# Patient Record
Sex: MALE | Race: WHITE | NOT HISPANIC OR LATINO | Employment: FULL TIME | URBAN - METROPOLITAN AREA
[De-identification: names, ages, dates, MRNs, and addresses within clinical notes are randomized per-mention and may not be internally consistent; named-entity substitution may affect disease eponyms.]

---

## 2020-11-24 ENCOUNTER — HOSPITAL ENCOUNTER (INPATIENT)
Facility: HOSPITAL | Age: 65
LOS: 8 days | Discharge: HOME | End: 2020-12-02
Attending: HOSPITALIST | Admitting: HOSPITALIST

## 2020-11-24 DIAGNOSIS — J96.01 ACUTE HYPOXEMIC RESPIRATORY FAILURE (HCC): Primary | ICD-10-CM

## 2020-11-24 DIAGNOSIS — J12.82 PNEUMONIA DUE TO COVID-19 VIRUS: ICD-10-CM

## 2020-11-24 DIAGNOSIS — U07.1 PNEUMONIA DUE TO COVID-19 VIRUS: ICD-10-CM

## 2020-11-24 PROBLEM — R09.02 HYPOXIA: Status: ACTIVE | Noted: 2020-11-24

## 2020-11-24 LAB
ALBUMIN SERPL-MCNC: 2.7 G/DL (ref 3.5–5.2)
ALP SERPL-CCNC: 68 U/L (ref 39–117)
ALT SERPL W P-5'-P-CCNC: 16 U/L (ref 1–41)
AST SERPL-CCNC: 28 U/L (ref 1–40)
BILIRUB CONJ SERPL-MCNC: 0.3 MG/DL (ref 0–0.3)
BILIRUB INDIRECT SERPL-MCNC: 0.2 MG/DL
BILIRUB SERPL-MCNC: 0.5 MG/DL (ref 0–1.2)
CREAT SERPL-MCNC: 0.67 MG/DL (ref 0.76–1.27)
FERRITIN SERPL-MCNC: 1040 NG/ML (ref 30–400)
GFR SERPL CREATININE-BSD FRML MDRD: 119 ML/MIN/1.73
LDH SERPL-CCNC: 454 U/L (ref 135–225)
PROCALCITONIN SERPL-MCNC: 0.36 NG/ML (ref 0–0.25)
PROT SERPL-MCNC: 5.9 G/DL (ref 6–8.5)

## 2020-11-24 PROCEDURE — 0202U NFCT DS 22 TRGT SARS-COV-2: CPT | Performed by: HOSPITALIST

## 2020-11-24 PROCEDURE — 94799 UNLISTED PULMONARY SVC/PX: CPT

## 2020-11-24 PROCEDURE — 82565 ASSAY OF CREATININE: CPT | Performed by: NURSE PRACTITIONER

## 2020-11-24 PROCEDURE — 83615 LACTATE (LD) (LDH) ENZYME: CPT | Performed by: NURSE PRACTITIONER

## 2020-11-24 PROCEDURE — 99223 1ST HOSP IP/OBS HIGH 75: CPT | Performed by: NURSE PRACTITIONER

## 2020-11-24 PROCEDURE — 80076 HEPATIC FUNCTION PANEL: CPT | Performed by: NURSE PRACTITIONER

## 2020-11-24 PROCEDURE — 87040 BLOOD CULTURE FOR BACTERIA: CPT | Performed by: NURSE PRACTITIONER

## 2020-11-24 PROCEDURE — 84145 PROCALCITONIN (PCT): CPT | Performed by: NURSE PRACTITIONER

## 2020-11-24 PROCEDURE — XW033E5 INTRODUCTION OF REMDESIVIR ANTI-INFECTIVE INTO PERIPHERAL VEIN, PERCUTANEOUS APPROACH, NEW TECHNOLOGY GROUP 5: ICD-10-PCS | Performed by: HOSPITALIST

## 2020-11-24 PROCEDURE — 25010000002 ENOXAPARIN PER 10 MG: Performed by: NURSE PRACTITIONER

## 2020-11-24 PROCEDURE — 82728 ASSAY OF FERRITIN: CPT | Performed by: NURSE PRACTITIONER

## 2020-11-24 RX ORDER — DEXAMETHASONE SODIUM PHOSPHATE 4 MG/ML
6 INJECTION, SOLUTION INTRA-ARTICULAR; INTRALESIONAL; INTRAMUSCULAR; INTRAVENOUS; SOFT TISSUE DAILY
Status: DISCONTINUED | OUTPATIENT
Start: 2020-11-25 | End: 2020-11-28

## 2020-11-24 RX ORDER — ONDANSETRON 4 MG/1
4 TABLET, ORALLY DISINTEGRATING ORAL EVERY 8 HOURS PRN
COMMUNITY

## 2020-11-24 RX ORDER — SODIUM CHLORIDE 0.9 % (FLUSH) 0.9 %
10 SYRINGE (ML) INJECTION EVERY 12 HOURS SCHEDULED
Status: DISCONTINUED | OUTPATIENT
Start: 2020-11-24 | End: 2020-12-02 | Stop reason: HOSPADM

## 2020-11-24 RX ORDER — ONDANSETRON 2 MG/ML
4 INJECTION INTRAMUSCULAR; INTRAVENOUS EVERY 6 HOURS PRN
Status: DISCONTINUED | OUTPATIENT
Start: 2020-11-24 | End: 2020-12-02 | Stop reason: HOSPADM

## 2020-11-24 RX ORDER — SODIUM CHLORIDE 9 MG/ML
100 INJECTION, SOLUTION INTRAVENOUS CONTINUOUS
Status: DISCONTINUED | OUTPATIENT
Start: 2020-11-24 | End: 2020-11-25

## 2020-11-24 RX ORDER — ONDANSETRON 4 MG/1
4 TABLET, FILM COATED ORAL EVERY 6 HOURS PRN
Status: DISCONTINUED | OUTPATIENT
Start: 2020-11-24 | End: 2020-12-02 | Stop reason: HOSPADM

## 2020-11-24 RX ORDER — TADALAFIL 20 MG/1
20 TABLET ORAL
COMMUNITY

## 2020-11-24 RX ORDER — ZINC SULFATE 50(220)MG
220 CAPSULE ORAL DAILY
Status: DISCONTINUED | OUTPATIENT
Start: 2020-11-24 | End: 2020-12-02 | Stop reason: HOSPADM

## 2020-11-24 RX ORDER — FAMOTIDINE 20 MG/1
20 TABLET, FILM COATED ORAL
Status: DISCONTINUED | OUTPATIENT
Start: 2020-11-24 | End: 2020-11-28

## 2020-11-24 RX ORDER — ASPIRIN 81 MG/1
81 TABLET ORAL DAILY
COMMUNITY

## 2020-11-24 RX ORDER — ASCORBIC ACID 500 MG
500 TABLET ORAL EVERY 6 HOURS SCHEDULED
Status: DISCONTINUED | OUTPATIENT
Start: 2020-11-24 | End: 2020-12-02 | Stop reason: HOSPADM

## 2020-11-24 RX ORDER — ACETAMINOPHEN 325 MG/1
650 TABLET ORAL EVERY 4 HOURS PRN
Status: DISCONTINUED | OUTPATIENT
Start: 2020-11-24 | End: 2020-12-02 | Stop reason: HOSPADM

## 2020-11-24 RX ORDER — AZITHROMYCIN 250 MG/1
250 TABLET, FILM COATED ORAL DAILY
Status: DISCONTINUED | OUTPATIENT
Start: 2020-11-25 | End: 2020-11-25

## 2020-11-24 RX ORDER — SODIUM CHLORIDE 0.9 % (FLUSH) 0.9 %
10 SYRINGE (ML) INJECTION AS NEEDED
Status: DISCONTINUED | OUTPATIENT
Start: 2020-11-24 | End: 2020-12-02 | Stop reason: HOSPADM

## 2020-11-24 RX ORDER — TERBINAFINE HYDROCHLORIDE 250 MG/1
250 TABLET ORAL DAILY
COMMUNITY

## 2020-11-24 RX ORDER — AMLODIPINE BESYLATE 5 MG/1
5 TABLET ORAL DAILY
COMMUNITY

## 2020-11-24 RX ORDER — ACETAMINOPHEN 650 MG/1
650 SUPPOSITORY RECTAL EVERY 4 HOURS PRN
Status: DISCONTINUED | OUTPATIENT
Start: 2020-11-24 | End: 2020-12-02 | Stop reason: HOSPADM

## 2020-11-24 RX ORDER — ACETAMINOPHEN 160 MG/5ML
650 SOLUTION ORAL EVERY 4 HOURS PRN
Status: DISCONTINUED | OUTPATIENT
Start: 2020-11-24 | End: 2020-12-02 | Stop reason: HOSPADM

## 2020-11-24 RX ORDER — MELOXICAM 15 MG/1
15 TABLET ORAL DAILY
COMMUNITY

## 2020-11-24 RX ORDER — CYCLOBENZAPRINE HCL 10 MG
10 TABLET ORAL 3 TIMES DAILY PRN
COMMUNITY

## 2020-11-24 RX ORDER — CHOLECALCIFEROL (VITAMIN D3) 125 MCG
5 CAPSULE ORAL NIGHTLY
Status: DISCONTINUED | OUTPATIENT
Start: 2020-11-24 | End: 2020-12-02

## 2020-11-24 RX ADMIN — SODIUM CHLORIDE 100 ML/HR: 9 INJECTION, SOLUTION INTRAVENOUS at 21:27

## 2020-11-24 RX ADMIN — Medication 5000 UNITS: at 21:26

## 2020-11-24 RX ADMIN — OXYCODONE HYDROCHLORIDE AND ACETAMINOPHEN 500 MG: 500 TABLET ORAL at 21:26

## 2020-11-24 RX ADMIN — SODIUM CHLORIDE 1000 ML: 9 INJECTION, SOLUTION INTRAVENOUS at 21:00

## 2020-11-24 RX ADMIN — REMDESIVIR 200 MG: 100 INJECTION, POWDER, LYOPHILIZED, FOR SOLUTION INTRAVENOUS at 23:21

## 2020-11-24 RX ADMIN — ZINC SULFATE 220 MG (50 MG) CAPSULE 220 MG: CAPSULE at 21:26

## 2020-11-24 RX ADMIN — Medication 10 ML: at 21:27

## 2020-11-24 RX ADMIN — ENOXAPARIN SODIUM 40 MG: 40 INJECTION SUBCUTANEOUS at 23:20

## 2020-11-24 RX ADMIN — FAMOTIDINE 20 MG: 20 TABLET ORAL at 21:26

## 2020-11-24 RX ADMIN — Medication 5 MG: at 21:26

## 2020-11-25 ENCOUNTER — APPOINTMENT (OUTPATIENT)
Dept: GENERAL RADIOLOGY | Facility: HOSPITAL | Age: 65
End: 2020-11-25

## 2020-11-25 PROBLEM — U07.1 LOWER RESPIRATORY TRACT INFECTION DUE TO SEVERE ACUTE RESPIRATORY SYNDROME CORONAVIRUS 2 (SARS-COV-2): Status: ACTIVE | Noted: 2020-11-16

## 2020-11-25 PROBLEM — J44.1 COPD WITH ACUTE EXACERBATION (HCC): Status: ACTIVE | Noted: 2020-11-25

## 2020-11-25 PROBLEM — J12.82 PNEUMONIA DUE TO COVID-19 VIRUS: Status: ACTIVE | Noted: 2020-11-25

## 2020-11-25 PROBLEM — G45.9 TRANSIENT ISCHEMIC ATTACK: Status: ACTIVE | Noted: 2020-01-09

## 2020-11-25 PROBLEM — I10 ESSENTIAL HYPERTENSION: Status: ACTIVE | Noted: 2020-11-25

## 2020-11-25 PROBLEM — J96.01 ACUTE HYPOXEMIC RESPIRATORY FAILURE (HCC): Status: ACTIVE | Noted: 2020-11-25

## 2020-11-25 PROBLEM — J22 LOWER RESPIRATORY TRACT INFECTION DUE TO SEVERE ACUTE RESPIRATORY SYNDROME CORONAVIRUS 2 (SARS-COV-2): Status: ACTIVE | Noted: 2020-11-16

## 2020-11-25 PROBLEM — U07.1 PNEUMONIA DUE TO COVID-19 VIRUS: Status: ACTIVE | Noted: 2020-11-25

## 2020-11-25 PROBLEM — Z87.891 FORMER HEAVY TOBACCO SMOKER: Status: ACTIVE | Noted: 2019-06-05

## 2020-11-25 LAB
ALBUMIN SERPL-MCNC: 2.8 G/DL (ref 3.5–5.2)
ALBUMIN/GLOB SERPL: 0.9 G/DL
ALP SERPL-CCNC: 68 U/L (ref 39–117)
ALT SERPL W P-5'-P-CCNC: 15 U/L (ref 1–41)
ANION GAP SERPL CALCULATED.3IONS-SCNC: 12 MMOL/L (ref 5–15)
ARTERIAL PATENCY WRIST A: POSITIVE
AST SERPL-CCNC: 26 U/L (ref 1–40)
ATMOSPHERIC PRESS: ABNORMAL MM[HG]
B PARAPERT DNA SPEC QL NAA+PROBE: NOT DETECTED
B PERT DNA SPEC QL NAA+PROBE: NOT DETECTED
BASE EXCESS BLDA CALC-SCNC: -1.9 MMOL/L (ref 0–3)
BASOPHILS # BLD AUTO: 0 10*3/MM3 (ref 0–0.2)
BASOPHILS NFR BLD AUTO: 0.1 % (ref 0–1.5)
BDY SITE: ABNORMAL
BILIRUB CONJ SERPL-MCNC: 0.2 MG/DL (ref 0–0.3)
BILIRUB SERPL-MCNC: 0.5 MG/DL (ref 0–1.2)
BUN SERPL-MCNC: 10 MG/DL (ref 8–23)
BUN/CREAT SERPL: 15.9 (ref 7–25)
C PNEUM DNA NPH QL NAA+NON-PROBE: NOT DETECTED
CALCIUM SPEC-SCNC: 7.7 MG/DL (ref 8.6–10.5)
CHLORIDE SERPL-SCNC: 105 MMOL/L (ref 98–107)
CK SERPL-CCNC: 95 U/L (ref 20–200)
CO2 BLDA-SCNC: 22.1 MMOL/L (ref 22–29)
CO2 SERPL-SCNC: 21 MMOL/L (ref 22–29)
CREAT SERPL-MCNC: 0.63 MG/DL (ref 0.76–1.27)
CRP SERPL-MCNC: 29.66 MG/DL (ref 0–0.5)
D DIMER PPP FEU-MCNC: 3.99 MG/L (FEU) (ref 0–0.59)
D-LACTATE SERPL-SCNC: 1.2 MMOL/L (ref 0.5–2)
DEPRECATED RDW RBC AUTO: 42.9 FL (ref 37–54)
EOSINOPHIL # BLD AUTO: 0 10*3/MM3 (ref 0–0.4)
EOSINOPHIL NFR BLD AUTO: 0 % (ref 0.3–6.2)
ERYTHROCYTE [DISTWIDTH] IN BLOOD BY AUTOMATED COUNT: 13.9 % (ref 12.3–15.4)
FERRITIN SERPL-MCNC: 1124 NG/ML (ref 30–400)
FIBRINOGEN PPP-MCNC: 824 MG/DL (ref 210–450)
FLUAV H1 2009 PAND RNA NPH QL NAA+PROBE: NOT DETECTED
FLUAV H1 HA GENE NPH QL NAA+PROBE: NOT DETECTED
FLUAV H3 RNA NPH QL NAA+PROBE: NOT DETECTED
FLUAV SUBTYP SPEC NAA+PROBE: NOT DETECTED
FLUBV RNA ISLT QL NAA+PROBE: NOT DETECTED
GFR SERPL CREATININE-BSD FRML MDRD: 128 ML/MIN/1.73
GLOBULIN UR ELPH-MCNC: 3 GM/DL
GLUCOSE SERPL-MCNC: 117 MG/DL (ref 65–99)
HADV DNA SPEC NAA+PROBE: NOT DETECTED
HCO3 BLDA-SCNC: 21.2 MMOL/L (ref 21–28)
HCOV 229E RNA SPEC QL NAA+PROBE: NOT DETECTED
HCOV HKU1 RNA SPEC QL NAA+PROBE: NOT DETECTED
HCOV NL63 RNA SPEC QL NAA+PROBE: NOT DETECTED
HCOV OC43 RNA SPEC QL NAA+PROBE: NOT DETECTED
HCT VFR BLD AUTO: 40.4 % (ref 37.5–51)
HEMODILUTION: NO
HGB BLD-MCNC: 13.5 G/DL (ref 13–17.7)
HMPV RNA NPH QL NAA+NON-PROBE: NOT DETECTED
HPIV1 RNA SPEC QL NAA+PROBE: NOT DETECTED
HPIV2 RNA SPEC QL NAA+PROBE: NOT DETECTED
HPIV3 RNA NPH QL NAA+PROBE: NOT DETECTED
HPIV4 P GENE NPH QL NAA+PROBE: NOT DETECTED
INHALED O2 CONCENTRATION: 100 %
L PNEUMO1 AG UR QL IA: NEGATIVE
LDH SERPL-CCNC: 485 U/L (ref 135–225)
LYMPHOCYTES # BLD AUTO: 0.2 10*3/MM3 (ref 0.7–3.1)
LYMPHOCYTES NFR BLD AUTO: 4.1 % (ref 19.6–45.3)
M PNEUMO IGG SER IA-ACNC: NOT DETECTED
MCH RBC QN AUTO: 29.9 PG (ref 26.6–33)
MCHC RBC AUTO-ENTMCNC: 33.3 G/DL (ref 31.5–35.7)
MCV RBC AUTO: 89.8 FL (ref 79–97)
MODALITY: ABNORMAL
MONOCYTES # BLD AUTO: 0.2 10*3/MM3 (ref 0.1–0.9)
MONOCYTES NFR BLD AUTO: 4.8 % (ref 5–12)
MRSA DNA SPEC QL NAA+PROBE: NORMAL
NEUTROPHILS NFR BLD AUTO: 4 10*3/MM3 (ref 1.7–7)
NEUTROPHILS NFR BLD AUTO: 91 % (ref 42.7–76)
NRBC BLD AUTO-RTO: 0.4 /100 WBC (ref 0–0.2)
PCO2 BLDA: 30.8 MM HG (ref 35–48)
PH BLDA: 7.45 PH UNITS (ref 7.35–7.45)
PLATELET # BLD AUTO: 341 10*3/MM3 (ref 140–450)
PMV BLD AUTO: 6.9 FL (ref 6–12)
PO2 BLDA: 71.4 MM HG (ref 83–108)
POTASSIUM SERPL-SCNC: 4.1 MMOL/L (ref 3.5–5.2)
PROCALCITONIN SERPL-MCNC: 0.31 NG/ML (ref 0–0.25)
PROT SERPL-MCNC: 5.8 G/DL (ref 6–8.5)
RBC # BLD AUTO: 4.5 10*6/MM3 (ref 4.14–5.8)
RHINOVIRUS RNA SPEC NAA+PROBE: NOT DETECTED
RSV RNA NPH QL NAA+NON-PROBE: NOT DETECTED
S PNEUM AG SPEC QL LA: NEGATIVE
SAO2 % BLDCOA: 95 % (ref 94–98)
SARS-COV-2 RNA NPH QL NAA+NON-PROBE: DETECTED
SODIUM SERPL-SCNC: 138 MMOL/L (ref 136–145)
WBC # BLD AUTO: 4.4 10*3/MM3 (ref 3.4–10.8)

## 2020-11-25 PROCEDURE — 83615 LACTATE (LD) (LDH) ENZYME: CPT | Performed by: NURSE PRACTITIONER

## 2020-11-25 PROCEDURE — 99232 SBSQ HOSP IP/OBS MODERATE 35: CPT | Performed by: HOSPITALIST

## 2020-11-25 PROCEDURE — 86140 C-REACTIVE PROTEIN: CPT | Performed by: NURSE PRACTITIONER

## 2020-11-25 PROCEDURE — 94799 UNLISTED PULMONARY SVC/PX: CPT

## 2020-11-25 PROCEDURE — 85379 FIBRIN DEGRADATION QUANT: CPT

## 2020-11-25 PROCEDURE — 36600 WITHDRAWAL OF ARTERIAL BLOOD: CPT

## 2020-11-25 PROCEDURE — 82803 BLOOD GASES ANY COMBINATION: CPT

## 2020-11-25 PROCEDURE — 85025 COMPLETE CBC W/AUTO DIFF WBC: CPT | Performed by: NURSE PRACTITIONER

## 2020-11-25 PROCEDURE — 25010000002 DEXAMETHASONE PER 1 MG: Performed by: NURSE PRACTITIONER

## 2020-11-25 PROCEDURE — 71045 X-RAY EXAM CHEST 1 VIEW: CPT

## 2020-11-25 PROCEDURE — 82728 ASSAY OF FERRITIN: CPT | Performed by: NURSE PRACTITIONER

## 2020-11-25 PROCEDURE — 25010000002 CEFEPIME PER 500 MG: Performed by: HOSPITALIST

## 2020-11-25 PROCEDURE — 87641 MR-STAPH DNA AMP PROBE: CPT | Performed by: HOSPITALIST

## 2020-11-25 PROCEDURE — 25010000002 ENOXAPARIN PER 10 MG: Performed by: NURSE PRACTITIONER

## 2020-11-25 PROCEDURE — 82248 BILIRUBIN DIRECT: CPT | Performed by: NURSE PRACTITIONER

## 2020-11-25 PROCEDURE — 85384 FIBRINOGEN ACTIVITY: CPT | Performed by: NURSE PRACTITIONER

## 2020-11-25 PROCEDURE — 83605 ASSAY OF LACTIC ACID: CPT | Performed by: NURSE PRACTITIONER

## 2020-11-25 PROCEDURE — 87899 AGENT NOS ASSAY W/OPTIC: CPT | Performed by: HOSPITALIST

## 2020-11-25 PROCEDURE — 84145 PROCALCITONIN (PCT): CPT | Performed by: NURSE PRACTITIONER

## 2020-11-25 PROCEDURE — 80053 COMPREHEN METABOLIC PANEL: CPT | Performed by: NURSE PRACTITIONER

## 2020-11-25 PROCEDURE — 82550 ASSAY OF CK (CPK): CPT | Performed by: NURSE PRACTITIONER

## 2020-11-25 RX ORDER — SODIUM CHLORIDE 9 MG/ML
100 INJECTION, SOLUTION INTRAVENOUS ONCE
Status: COMPLETED | OUTPATIENT
Start: 2020-11-25 | End: 2020-11-25

## 2020-11-25 RX ORDER — ALBUTEROL SULFATE 90 UG/1
2 AEROSOL, METERED RESPIRATORY (INHALATION)
Status: DISCONTINUED | OUTPATIENT
Start: 2020-11-25 | End: 2020-12-02 | Stop reason: HOSPADM

## 2020-11-25 RX ORDER — AMLODIPINE BESYLATE 5 MG/1
5 TABLET ORAL DAILY
Status: DISCONTINUED | OUTPATIENT
Start: 2020-11-25 | End: 2020-12-02 | Stop reason: HOSPADM

## 2020-11-25 RX ORDER — ASPIRIN 81 MG/1
81 TABLET ORAL DAILY
Status: DISCONTINUED | OUTPATIENT
Start: 2020-11-25 | End: 2020-12-02 | Stop reason: HOSPADM

## 2020-11-25 RX ORDER — CYCLOBENZAPRINE HCL 10 MG
10 TABLET ORAL 3 TIMES DAILY PRN
Status: DISCONTINUED | OUTPATIENT
Start: 2020-11-25 | End: 2020-12-02 | Stop reason: HOSPADM

## 2020-11-25 RX ADMIN — DEXAMETHASONE SODIUM PHOSPHATE 6 MG: 4 INJECTION, SOLUTION INTRAMUSCULAR; INTRAVENOUS at 08:17

## 2020-11-25 RX ADMIN — Medication 5 MG: at 20:16

## 2020-11-25 RX ADMIN — ASPIRIN 81 MG: 81 TABLET, COATED ORAL at 08:18

## 2020-11-25 RX ADMIN — ENOXAPARIN SODIUM 40 MG: 40 INJECTION SUBCUTANEOUS at 12:13

## 2020-11-25 RX ADMIN — Medication 10 ML: at 20:16

## 2020-11-25 RX ADMIN — OXYCODONE HYDROCHLORIDE AND ACETAMINOPHEN 500 MG: 500 TABLET ORAL at 05:40

## 2020-11-25 RX ADMIN — CEFEPIME 2 G: 2 INJECTION, POWDER, FOR SOLUTION INTRAVENOUS at 16:57

## 2020-11-25 RX ADMIN — ENOXAPARIN SODIUM 40 MG: 40 INJECTION SUBCUTANEOUS at 23:07

## 2020-11-25 RX ADMIN — Medication 10 ML: at 08:18

## 2020-11-25 RX ADMIN — AMLODIPINE BESYLATE 5 MG: 5 TABLET ORAL at 08:18

## 2020-11-25 RX ADMIN — FAMOTIDINE 20 MG: 20 TABLET ORAL at 16:57

## 2020-11-25 RX ADMIN — Medication 600 MG: at 08:16

## 2020-11-25 RX ADMIN — OXYCODONE HYDROCHLORIDE AND ACETAMINOPHEN 500 MG: 500 TABLET ORAL at 16:57

## 2020-11-25 RX ADMIN — OXYCODONE HYDROCHLORIDE AND ACETAMINOPHEN 500 MG: 500 TABLET ORAL at 23:07

## 2020-11-25 RX ADMIN — ALBUTEROL SULFATE 2 PUFF: 108 AEROSOL, METERED RESPIRATORY (INHALATION) at 07:38

## 2020-11-25 RX ADMIN — AZITHROMYCIN 250 MG: 250 TABLET, FILM COATED ORAL at 08:18

## 2020-11-25 RX ADMIN — ALBUTEROL SULFATE 2 PUFF: 108 AEROSOL, METERED RESPIRATORY (INHALATION) at 15:01

## 2020-11-25 RX ADMIN — THEOPHYLLINE ANHYDROUS 300 MG: 300 CAPSULE, EXTENDED RELEASE ORAL at 08:32

## 2020-11-25 RX ADMIN — FAMOTIDINE 20 MG: 20 TABLET ORAL at 06:39

## 2020-11-25 RX ADMIN — OXYCODONE HYDROCHLORIDE AND ACETAMINOPHEN 500 MG: 500 TABLET ORAL at 12:13

## 2020-11-25 RX ADMIN — REMDESIVIR 100 MG: 100 INJECTION, POWDER, LYOPHILIZED, FOR SOLUTION INTRAVENOUS at 23:08

## 2020-11-25 RX ADMIN — ALBUTEROL SULFATE 2 PUFF: 108 AEROSOL, METERED RESPIRATORY (INHALATION) at 18:09

## 2020-11-25 RX ADMIN — SODIUM CHLORIDE 100 ML/HR: 9 INJECTION, SOLUTION INTRAVENOUS at 13:54

## 2020-11-25 RX ADMIN — Medication 600 MG: at 20:16

## 2020-11-25 RX ADMIN — ALBUTEROL SULFATE 2 PUFF: 108 AEROSOL, METERED RESPIRATORY (INHALATION) at 11:35

## 2020-11-26 LAB
ALBUMIN SERPL-MCNC: 2.8 G/DL (ref 3.5–5.2)
ALBUMIN/GLOB SERPL: 1 G/DL
ALP SERPL-CCNC: 68 U/L (ref 39–117)
ALT SERPL W P-5'-P-CCNC: 17 U/L (ref 1–41)
ANION GAP SERPL CALCULATED.3IONS-SCNC: 8 MMOL/L (ref 5–15)
AST SERPL-CCNC: 23 U/L (ref 1–40)
BASOPHILS # BLD AUTO: 0 10*3/MM3 (ref 0–0.2)
BASOPHILS NFR BLD AUTO: 0.1 % (ref 0–1.5)
BILIRUB CONJ SERPL-MCNC: <0.2 MG/DL (ref 0–0.3)
BILIRUB SERPL-MCNC: 0.3 MG/DL (ref 0–1.2)
BUN SERPL-MCNC: 11 MG/DL (ref 8–23)
BUN/CREAT SERPL: 19.3 (ref 7–25)
CALCIUM SPEC-SCNC: 7.7 MG/DL (ref 8.6–10.5)
CHLORIDE SERPL-SCNC: 108 MMOL/L (ref 98–107)
CK SERPL-CCNC: 82 U/L (ref 20–200)
CO2 SERPL-SCNC: 23 MMOL/L (ref 22–29)
CREAT SERPL-MCNC: 0.57 MG/DL (ref 0.76–1.27)
CRP SERPL-MCNC: 8.99 MG/DL (ref 0–0.5)
DEPRECATED RDW RBC AUTO: 43.8 FL (ref 37–54)
EOSINOPHIL # BLD AUTO: 0 10*3/MM3 (ref 0–0.4)
EOSINOPHIL NFR BLD AUTO: 0 % (ref 0.3–6.2)
ERYTHROCYTE [DISTWIDTH] IN BLOOD BY AUTOMATED COUNT: 13.9 % (ref 12.3–15.4)
FERRITIN SERPL-MCNC: 1301 NG/ML (ref 30–400)
GFR SERPL CREATININE-BSD FRML MDRD: 143 ML/MIN/1.73
GLOBULIN UR ELPH-MCNC: 2.8 GM/DL
GLUCOSE SERPL-MCNC: 146 MG/DL (ref 65–99)
HCT VFR BLD AUTO: 35.8 % (ref 37.5–51)
HGB BLD-MCNC: 11.8 G/DL (ref 13–17.7)
LYMPHOCYTES # BLD AUTO: 0.3 10*3/MM3 (ref 0.7–3.1)
LYMPHOCYTES NFR BLD AUTO: 3.3 % (ref 19.6–45.3)
MCH RBC QN AUTO: 29.4 PG (ref 26.6–33)
MCHC RBC AUTO-ENTMCNC: 33.1 G/DL (ref 31.5–35.7)
MCV RBC AUTO: 88.9 FL (ref 79–97)
MONOCYTES # BLD AUTO: 0.5 10*3/MM3 (ref 0.1–0.9)
MONOCYTES NFR BLD AUTO: 7 % (ref 5–12)
NEUTROPHILS NFR BLD AUTO: 7 10*3/MM3 (ref 1.7–7)
NEUTROPHILS NFR BLD AUTO: 89.6 % (ref 42.7–76)
NRBC BLD AUTO-RTO: 0.1 /100 WBC (ref 0–0.2)
PLATELET # BLD AUTO: 383 10*3/MM3 (ref 140–450)
PMV BLD AUTO: 6.5 FL (ref 6–12)
POTASSIUM SERPL-SCNC: 3.9 MMOL/L (ref 3.5–5.2)
PROT SERPL-MCNC: 5.6 G/DL (ref 6–8.5)
RBC # BLD AUTO: 4.02 10*6/MM3 (ref 4.14–5.8)
SODIUM SERPL-SCNC: 139 MMOL/L (ref 136–145)
WBC # BLD AUTO: 7.8 10*3/MM3 (ref 3.4–10.8)

## 2020-11-26 PROCEDURE — 25010000002 ENOXAPARIN PER 10 MG: Performed by: NURSE PRACTITIONER

## 2020-11-26 PROCEDURE — 25010000002 VANCOMYCIN 10 G RECONSTITUTED SOLUTION: Performed by: HOSPITALIST

## 2020-11-26 PROCEDURE — 82248 BILIRUBIN DIRECT: CPT | Performed by: NURSE PRACTITIONER

## 2020-11-26 PROCEDURE — 99232 SBSQ HOSP IP/OBS MODERATE 35: CPT | Performed by: HOSPITALIST

## 2020-11-26 PROCEDURE — 82728 ASSAY OF FERRITIN: CPT | Performed by: NURSE PRACTITIONER

## 2020-11-26 PROCEDURE — 80053 COMPREHEN METABOLIC PANEL: CPT | Performed by: NURSE PRACTITIONER

## 2020-11-26 PROCEDURE — 94799 UNLISTED PULMONARY SVC/PX: CPT

## 2020-11-26 PROCEDURE — 25010000002 CEFEPIME PER 500 MG: Performed by: HOSPITALIST

## 2020-11-26 PROCEDURE — 25010000002 ONDANSETRON PER 1 MG: Performed by: NURSE PRACTITIONER

## 2020-11-26 PROCEDURE — 82550 ASSAY OF CK (CPK): CPT | Performed by: NURSE PRACTITIONER

## 2020-11-26 PROCEDURE — 25010000002 DEXAMETHASONE PER 1 MG: Performed by: NURSE PRACTITIONER

## 2020-11-26 PROCEDURE — 86140 C-REACTIVE PROTEIN: CPT | Performed by: NURSE PRACTITIONER

## 2020-11-26 PROCEDURE — 85025 COMPLETE CBC W/AUTO DIFF WBC: CPT | Performed by: NURSE PRACTITIONER

## 2020-11-26 RX ORDER — AMOXICILLIN AND CLAVULANATE POTASSIUM 875; 125 MG/1; MG/1
1 TABLET, FILM COATED ORAL EVERY 12 HOURS SCHEDULED
Status: DISCONTINUED | OUTPATIENT
Start: 2020-11-26 | End: 2020-12-02 | Stop reason: HOSPADM

## 2020-11-26 RX ORDER — FUROSEMIDE 20 MG/1
20 TABLET ORAL DAILY
Status: DISCONTINUED | OUTPATIENT
Start: 2020-11-26 | End: 2020-12-02 | Stop reason: HOSPADM

## 2020-11-26 RX ORDER — VANCOMYCIN 1.75 GRAM/500 ML IN 0.9 % SODIUM CHLORIDE INTRAVENOUS
20 ONCE
Status: COMPLETED | OUTPATIENT
Start: 2020-11-26 | End: 2020-11-26

## 2020-11-26 RX ADMIN — ONDANSETRON 4 MG: 2 INJECTION, SOLUTION INTRAMUSCULAR; INTRAVENOUS at 08:47

## 2020-11-26 RX ADMIN — Medication 10 ML: at 20:49

## 2020-11-26 RX ADMIN — Medication 600 MG: at 08:21

## 2020-11-26 RX ADMIN — ALBUTEROL SULFATE 2 PUFF: 108 AEROSOL, METERED RESPIRATORY (INHALATION) at 19:04

## 2020-11-26 RX ADMIN — FAMOTIDINE 20 MG: 20 TABLET ORAL at 08:21

## 2020-11-26 RX ADMIN — ENOXAPARIN SODIUM 40 MG: 40 INJECTION SUBCUTANEOUS at 08:20

## 2020-11-26 RX ADMIN — ACETAMINOPHEN 650 MG: 325 TABLET, FILM COATED ORAL at 11:36

## 2020-11-26 RX ADMIN — ALBUTEROL SULFATE 2 PUFF: 108 AEROSOL, METERED RESPIRATORY (INHALATION) at 06:47

## 2020-11-26 RX ADMIN — ASPIRIN 81 MG: 81 TABLET, COATED ORAL at 08:21

## 2020-11-26 RX ADMIN — AMLODIPINE BESYLATE 5 MG: 5 TABLET ORAL at 09:03

## 2020-11-26 RX ADMIN — VANCOMYCIN HYDROCHLORIDE 1750 MG: 10 INJECTION, POWDER, LYOPHILIZED, FOR SOLUTION INTRAVENOUS at 12:15

## 2020-11-26 RX ADMIN — THEOPHYLLINE ANHYDROUS 300 MG: 300 CAPSULE, EXTENDED RELEASE ORAL at 09:06

## 2020-11-26 RX ADMIN — OXYCODONE HYDROCHLORIDE AND ACETAMINOPHEN 500 MG: 500 TABLET ORAL at 18:14

## 2020-11-26 RX ADMIN — CEFEPIME 2 G: 2 INJECTION, POWDER, FOR SOLUTION INTRAVENOUS at 00:54

## 2020-11-26 RX ADMIN — DEXAMETHASONE SODIUM PHOSPHATE 6 MG: 4 INJECTION, SOLUTION INTRAMUSCULAR; INTRAVENOUS at 08:21

## 2020-11-26 RX ADMIN — ZINC SULFATE 220 MG (50 MG) CAPSULE 220 MG: CAPSULE at 08:21

## 2020-11-26 RX ADMIN — FUROSEMIDE 20 MG: 20 TABLET ORAL at 08:21

## 2020-11-26 RX ADMIN — OXYCODONE HYDROCHLORIDE AND ACETAMINOPHEN 500 MG: 500 TABLET ORAL at 23:15

## 2020-11-26 RX ADMIN — AMOXICILLIN AND CLAVULANATE POTASSIUM 1 TABLET: 875; 125 TABLET, FILM COATED ORAL at 14:11

## 2020-11-26 RX ADMIN — OXYCODONE HYDROCHLORIDE AND ACETAMINOPHEN 500 MG: 500 TABLET ORAL at 04:57

## 2020-11-26 RX ADMIN — Medication 5000 UNITS: at 08:21

## 2020-11-26 RX ADMIN — Medication 5 MG: at 20:49

## 2020-11-26 RX ADMIN — OXYCODONE HYDROCHLORIDE AND ACETAMINOPHEN 500 MG: 500 TABLET ORAL at 08:21

## 2020-11-26 RX ADMIN — AMOXICILLIN AND CLAVULANATE POTASSIUM 1 TABLET: 875; 125 TABLET, FILM COATED ORAL at 20:49

## 2020-11-26 RX ADMIN — ALBUTEROL SULFATE 2 PUFF: 108 AEROSOL, METERED RESPIRATORY (INHALATION) at 15:07

## 2020-11-26 RX ADMIN — REMDESIVIR 100 MG: 100 INJECTION, POWDER, LYOPHILIZED, FOR SOLUTION INTRAVENOUS at 22:04

## 2020-11-26 RX ADMIN — Medication 10 ML: at 08:47

## 2020-11-26 RX ADMIN — Medication 600 MG: at 20:49

## 2020-11-26 RX ADMIN — ENOXAPARIN SODIUM 40 MG: 40 INJECTION SUBCUTANEOUS at 22:04

## 2020-11-26 RX ADMIN — CEFEPIME 2 G: 2 INJECTION, POWDER, FOR SOLUTION INTRAVENOUS at 09:02

## 2020-11-26 RX ADMIN — ALBUTEROL SULFATE 2 PUFF: 108 AEROSOL, METERED RESPIRATORY (INHALATION) at 11:57

## 2020-11-26 RX ADMIN — FAMOTIDINE 20 MG: 20 TABLET ORAL at 14:11

## 2020-11-27 LAB
ALBUMIN SERPL-MCNC: 3.1 G/DL (ref 3.5–5.2)
ALBUMIN/GLOB SERPL: 1.1 G/DL
ALP SERPL-CCNC: 85 U/L (ref 39–117)
ALT SERPL W P-5'-P-CCNC: 22 U/L (ref 1–41)
ANION GAP SERPL CALCULATED.3IONS-SCNC: 10 MMOL/L (ref 5–15)
AST SERPL-CCNC: 27 U/L (ref 1–40)
BASOPHILS # BLD AUTO: 0 10*3/MM3 (ref 0–0.2)
BASOPHILS NFR BLD AUTO: 0.2 % (ref 0–1.5)
BILIRUB CONJ SERPL-MCNC: 0.2 MG/DL (ref 0–0.3)
BILIRUB SERPL-MCNC: 0.5 MG/DL (ref 0–1.2)
BUN SERPL-MCNC: 14 MG/DL (ref 8–23)
BUN/CREAT SERPL: 22.2 (ref 7–25)
CALCIUM SPEC-SCNC: 8 MG/DL (ref 8.6–10.5)
CHLORIDE SERPL-SCNC: 106 MMOL/L (ref 98–107)
CK SERPL-CCNC: 164 U/L (ref 20–200)
CO2 SERPL-SCNC: 21 MMOL/L (ref 22–29)
CREAT SERPL-MCNC: 0.63 MG/DL (ref 0.76–1.27)
CRP SERPL-MCNC: 4.68 MG/DL (ref 0–0.5)
D DIMER PPP FEU-MCNC: 1.98 MG/L (FEU) (ref 0–0.59)
DEPRECATED RDW RBC AUTO: 42.9 FL (ref 37–54)
EOSINOPHIL # BLD AUTO: 0 10*3/MM3 (ref 0–0.4)
EOSINOPHIL NFR BLD AUTO: 0 % (ref 0.3–6.2)
ERYTHROCYTE [DISTWIDTH] IN BLOOD BY AUTOMATED COUNT: 14 % (ref 12.3–15.4)
FERRITIN SERPL-MCNC: 1176 NG/ML (ref 30–400)
FIBRINOGEN PPP-MCNC: 534 MG/DL (ref 210–450)
GFR SERPL CREATININE-BSD FRML MDRD: 128 ML/MIN/1.73
GLOBULIN UR ELPH-MCNC: 2.8 GM/DL
GLUCOSE SERPL-MCNC: 109 MG/DL (ref 65–99)
HCT VFR BLD AUTO: 40.3 % (ref 37.5–51)
HGB BLD-MCNC: 13.8 G/DL (ref 13–17.7)
LDH SERPL-CCNC: 448 U/L (ref 135–225)
LYMPHOCYTES # BLD AUTO: 0.4 10*3/MM3 (ref 0.7–3.1)
LYMPHOCYTES NFR BLD AUTO: 4 % (ref 19.6–45.3)
MCH RBC QN AUTO: 30.4 PG (ref 26.6–33)
MCHC RBC AUTO-ENTMCNC: 34.2 G/DL (ref 31.5–35.7)
MCV RBC AUTO: 88.7 FL (ref 79–97)
MONOCYTES # BLD AUTO: 0.5 10*3/MM3 (ref 0.1–0.9)
MONOCYTES NFR BLD AUTO: 5.7 % (ref 5–12)
NEUTROPHILS NFR BLD AUTO: 8.6 10*3/MM3 (ref 1.7–7)
NEUTROPHILS NFR BLD AUTO: 90.1 % (ref 42.7–76)
NRBC BLD AUTO-RTO: 0.1 /100 WBC (ref 0–0.2)
PLATELET # BLD AUTO: 455 10*3/MM3 (ref 140–450)
PMV BLD AUTO: 6.2 FL (ref 6–12)
POTASSIUM SERPL-SCNC: 4.1 MMOL/L (ref 3.5–5.2)
PROCALCITONIN SERPL-MCNC: 0.11 NG/ML (ref 0–0.25)
PROT SERPL-MCNC: 5.9 G/DL (ref 6–8.5)
RBC # BLD AUTO: 4.55 10*6/MM3 (ref 4.14–5.8)
SODIUM SERPL-SCNC: 137 MMOL/L (ref 136–145)
WBC # BLD AUTO: 9.5 10*3/MM3 (ref 3.4–10.8)

## 2020-11-27 PROCEDURE — 82550 ASSAY OF CK (CPK): CPT | Performed by: NURSE PRACTITIONER

## 2020-11-27 PROCEDURE — 99232 SBSQ HOSP IP/OBS MODERATE 35: CPT | Performed by: HOSPITALIST

## 2020-11-27 PROCEDURE — 80053 COMPREHEN METABOLIC PANEL: CPT | Performed by: NURSE PRACTITIONER

## 2020-11-27 PROCEDURE — 84145 PROCALCITONIN (PCT): CPT | Performed by: NURSE PRACTITIONER

## 2020-11-27 PROCEDURE — 94799 UNLISTED PULMONARY SVC/PX: CPT

## 2020-11-27 PROCEDURE — 83615 LACTATE (LD) (LDH) ENZYME: CPT | Performed by: NURSE PRACTITIONER

## 2020-11-27 PROCEDURE — 25010000002 ENOXAPARIN PER 10 MG: Performed by: NURSE PRACTITIONER

## 2020-11-27 PROCEDURE — 85384 FIBRINOGEN ACTIVITY: CPT | Performed by: NURSE PRACTITIONER

## 2020-11-27 PROCEDURE — 86140 C-REACTIVE PROTEIN: CPT | Performed by: NURSE PRACTITIONER

## 2020-11-27 PROCEDURE — 25010000002 DEXAMETHASONE PER 1 MG: Performed by: NURSE PRACTITIONER

## 2020-11-27 PROCEDURE — 82728 ASSAY OF FERRITIN: CPT | Performed by: NURSE PRACTITIONER

## 2020-11-27 PROCEDURE — 85379 FIBRIN DEGRADATION QUANT: CPT | Performed by: HOSPITALIST

## 2020-11-27 PROCEDURE — 85025 COMPLETE CBC W/AUTO DIFF WBC: CPT | Performed by: NURSE PRACTITIONER

## 2020-11-27 PROCEDURE — 82248 BILIRUBIN DIRECT: CPT | Performed by: NURSE PRACTITIONER

## 2020-11-27 RX ORDER — COLCHICINE 0.6 MG/1
0.6 TABLET ORAL DAILY
Status: DISCONTINUED | OUTPATIENT
Start: 2020-11-27 | End: 2020-12-02 | Stop reason: HOSPADM

## 2020-11-27 RX ADMIN — Medication 10 ML: at 09:19

## 2020-11-27 RX ADMIN — COLCHICINE 0.6 MG: 0.6 TABLET, FILM COATED ORAL at 12:16

## 2020-11-27 RX ADMIN — OXYCODONE HYDROCHLORIDE AND ACETAMINOPHEN 500 MG: 500 TABLET ORAL at 12:17

## 2020-11-27 RX ADMIN — Medication 5 MG: at 20:00

## 2020-11-27 RX ADMIN — Medication 10 ML: at 20:01

## 2020-11-27 RX ADMIN — FAMOTIDINE 20 MG: 20 TABLET ORAL at 17:27

## 2020-11-27 RX ADMIN — CYCLOBENZAPRINE HYDROCHLORIDE 10 MG: 10 TABLET, FILM COATED ORAL at 21:54

## 2020-11-27 RX ADMIN — ALBUTEROL SULFATE 2 PUFF: 108 AEROSOL, METERED RESPIRATORY (INHALATION) at 15:42

## 2020-11-27 RX ADMIN — FAMOTIDINE 20 MG: 20 TABLET ORAL at 09:13

## 2020-11-27 RX ADMIN — DEXAMETHASONE SODIUM PHOSPHATE 6 MG: 4 INJECTION, SOLUTION INTRAMUSCULAR; INTRAVENOUS at 09:13

## 2020-11-27 RX ADMIN — ALBUTEROL SULFATE 2 PUFF: 108 AEROSOL, METERED RESPIRATORY (INHALATION) at 08:36

## 2020-11-27 RX ADMIN — AMLODIPINE BESYLATE 5 MG: 5 TABLET ORAL at 09:13

## 2020-11-27 RX ADMIN — ALBUTEROL SULFATE 2 PUFF: 108 AEROSOL, METERED RESPIRATORY (INHALATION) at 11:15

## 2020-11-27 RX ADMIN — Medication 5000 UNITS: at 09:12

## 2020-11-27 RX ADMIN — OXYCODONE HYDROCHLORIDE AND ACETAMINOPHEN 500 MG: 500 TABLET ORAL at 05:22

## 2020-11-27 RX ADMIN — OXYCODONE HYDROCHLORIDE AND ACETAMINOPHEN 500 MG: 500 TABLET ORAL at 17:27

## 2020-11-27 RX ADMIN — Medication 600 MG: at 09:13

## 2020-11-27 RX ADMIN — Medication 600 MG: at 20:00

## 2020-11-27 RX ADMIN — THEOPHYLLINE ANHYDROUS 300 MG: 300 CAPSULE, EXTENDED RELEASE ORAL at 09:13

## 2020-11-27 RX ADMIN — FUROSEMIDE 20 MG: 20 TABLET ORAL at 09:13

## 2020-11-27 RX ADMIN — AMOXICILLIN AND CLAVULANATE POTASSIUM 1 TABLET: 875; 125 TABLET, FILM COATED ORAL at 20:00

## 2020-11-27 RX ADMIN — ZINC SULFATE 220 MG (50 MG) CAPSULE 220 MG: CAPSULE at 09:13

## 2020-11-27 RX ADMIN — REMDESIVIR 100 MG: 100 INJECTION, POWDER, LYOPHILIZED, FOR SOLUTION INTRAVENOUS at 23:45

## 2020-11-27 RX ADMIN — ENOXAPARIN SODIUM 40 MG: 40 INJECTION SUBCUTANEOUS at 12:17

## 2020-11-27 RX ADMIN — ASPIRIN 81 MG: 81 TABLET, COATED ORAL at 09:13

## 2020-11-27 RX ADMIN — AMOXICILLIN AND CLAVULANATE POTASSIUM 1 TABLET: 875; 125 TABLET, FILM COATED ORAL at 09:13

## 2020-11-27 RX ADMIN — ENOXAPARIN SODIUM 40 MG: 40 INJECTION SUBCUTANEOUS at 23:45

## 2020-11-27 RX ADMIN — OXYCODONE HYDROCHLORIDE AND ACETAMINOPHEN 500 MG: 500 TABLET ORAL at 23:44

## 2020-11-27 RX ADMIN — ALBUTEROL SULFATE 2 PUFF: 108 AEROSOL, METERED RESPIRATORY (INHALATION) at 19:15

## 2020-11-28 LAB
ALBUMIN SERPL-MCNC: 3.2 G/DL (ref 3.5–5.2)
ALBUMIN/GLOB SERPL: 1.1 G/DL
ALP SERPL-CCNC: 83 U/L (ref 39–117)
ALT SERPL W P-5'-P-CCNC: 47 U/L (ref 1–41)
ANION GAP SERPL CALCULATED.3IONS-SCNC: 10 MMOL/L (ref 5–15)
ANISOCYTOSIS BLD QL: ABNORMAL
AST SERPL-CCNC: 50 U/L (ref 1–40)
BILIRUB CONJ SERPL-MCNC: 0.3 MG/DL (ref 0–0.3)
BILIRUB SERPL-MCNC: 0.5 MG/DL (ref 0–1.2)
BUN SERPL-MCNC: 13 MG/DL (ref 8–23)
BUN/CREAT SERPL: 20 (ref 7–25)
CALCIUM SPEC-SCNC: 8 MG/DL (ref 8.6–10.5)
CHLORIDE SERPL-SCNC: 105 MMOL/L (ref 98–107)
CK SERPL-CCNC: 142 U/L (ref 20–200)
CO2 SERPL-SCNC: 21 MMOL/L (ref 22–29)
CREAT SERPL-MCNC: 0.65 MG/DL (ref 0.76–1.27)
CRP SERPL-MCNC: 3.15 MG/DL (ref 0–0.5)
DEPRECATED RDW RBC AUTO: 43.3 FL (ref 37–54)
ERYTHROCYTE [DISTWIDTH] IN BLOOD BY AUTOMATED COUNT: 14.1 % (ref 12.3–15.4)
FERRITIN SERPL-MCNC: 950 NG/ML (ref 30–400)
GFR SERPL CREATININE-BSD FRML MDRD: 123 ML/MIN/1.73
GLOBULIN UR ELPH-MCNC: 2.8 GM/DL
GLUCOSE SERPL-MCNC: 133 MG/DL (ref 65–99)
HCT VFR BLD AUTO: 39.6 % (ref 37.5–51)
HGB BLD-MCNC: 13.3 G/DL (ref 13–17.7)
LYMPHOCYTES # BLD MANUAL: 0.28 10*3/MM3 (ref 0.7–3.1)
LYMPHOCYTES NFR BLD MANUAL: 4 % (ref 19.6–45.3)
LYMPHOCYTES NFR BLD MANUAL: 4 % (ref 5–12)
MCH RBC QN AUTO: 29.7 PG (ref 26.6–33)
MCHC RBC AUTO-ENTMCNC: 33.6 G/DL (ref 31.5–35.7)
MCV RBC AUTO: 88.5 FL (ref 79–97)
METAMYELOCYTES NFR BLD MANUAL: 2 % (ref 0–0)
MONOCYTES # BLD AUTO: 0.28 10*3/MM3 (ref 0.1–0.9)
MYELOCYTES NFR BLD MANUAL: 1 % (ref 0–0)
NEUTROPHILS # BLD AUTO: 6.23 10*3/MM3 (ref 1.7–7)
NEUTROPHILS NFR BLD MANUAL: 82 % (ref 42.7–76)
NEUTS BAND NFR BLD MANUAL: 7 % (ref 0–5)
PLAT MORPH BLD: NORMAL
PLATELET # BLD AUTO: 487 10*3/MM3 (ref 140–450)
PMV BLD AUTO: 6.6 FL (ref 6–12)
POTASSIUM SERPL-SCNC: 3.9 MMOL/L (ref 3.5–5.2)
PROT SERPL-MCNC: 6 G/DL (ref 6–8.5)
RBC # BLD AUTO: 4.48 10*6/MM3 (ref 4.14–5.8)
SCAN SLIDE: NORMAL
SODIUM SERPL-SCNC: 136 MMOL/L (ref 136–145)
TOXIC GRANULATION: ABNORMAL
VANCOMYCIN PEAK SERPL-MCNC: <4 MCG/ML (ref 20–40)
VANCOMYCIN TROUGH SERPL-MCNC: <4 MCG/ML (ref 5–20)
WBC # BLD AUTO: 7 10*3/MM3 (ref 3.4–10.8)

## 2020-11-28 PROCEDURE — 94799 UNLISTED PULMONARY SVC/PX: CPT

## 2020-11-28 PROCEDURE — 80202 ASSAY OF VANCOMYCIN: CPT | Performed by: HOSPITALIST

## 2020-11-28 PROCEDURE — 85025 COMPLETE CBC W/AUTO DIFF WBC: CPT | Performed by: NURSE PRACTITIONER

## 2020-11-28 PROCEDURE — 25010000002 DEXAMETHASONE PER 1 MG: Performed by: NURSE PRACTITIONER

## 2020-11-28 PROCEDURE — 94760 N-INVAS EAR/PLS OXIMETRY 1: CPT

## 2020-11-28 PROCEDURE — 82728 ASSAY OF FERRITIN: CPT | Performed by: NURSE PRACTITIONER

## 2020-11-28 PROCEDURE — 82550 ASSAY OF CK (CPK): CPT | Performed by: NURSE PRACTITIONER

## 2020-11-28 PROCEDURE — 86140 C-REACTIVE PROTEIN: CPT | Performed by: NURSE PRACTITIONER

## 2020-11-28 PROCEDURE — 25010000002 ENOXAPARIN PER 10 MG: Performed by: NURSE PRACTITIONER

## 2020-11-28 PROCEDURE — 80053 COMPREHEN METABOLIC PANEL: CPT | Performed by: NURSE PRACTITIONER

## 2020-11-28 PROCEDURE — 99232 SBSQ HOSP IP/OBS MODERATE 35: CPT | Performed by: HOSPITALIST

## 2020-11-28 PROCEDURE — 82248 BILIRUBIN DIRECT: CPT | Performed by: NURSE PRACTITIONER

## 2020-11-28 PROCEDURE — 85007 BL SMEAR W/DIFF WBC COUNT: CPT | Performed by: NURSE PRACTITIONER

## 2020-11-28 RX ORDER — AMOXICILLIN 250 MG
2 CAPSULE ORAL ONCE
Status: DISCONTINUED | OUTPATIENT
Start: 2020-11-28 | End: 2020-12-02 | Stop reason: HOSPADM

## 2020-11-28 RX ORDER — POLYETHYLENE GLYCOL 3350 17 G/17G
17 POWDER, FOR SOLUTION ORAL DAILY
Status: DISCONTINUED | OUTPATIENT
Start: 2020-11-28 | End: 2020-12-02 | Stop reason: HOSPADM

## 2020-11-28 RX ORDER — DEXAMETHASONE 6 MG/1
6 TABLET ORAL
Status: DISCONTINUED | OUTPATIENT
Start: 2020-11-29 | End: 2020-12-01

## 2020-11-28 RX ADMIN — Medication 600 MG: at 20:31

## 2020-11-28 RX ADMIN — Medication 10 ML: at 08:42

## 2020-11-28 RX ADMIN — ASPIRIN 81 MG: 81 TABLET, COATED ORAL at 08:44

## 2020-11-28 RX ADMIN — OXYCODONE HYDROCHLORIDE AND ACETAMINOPHEN 500 MG: 500 TABLET ORAL at 11:23

## 2020-11-28 RX ADMIN — ALBUTEROL SULFATE 2 PUFF: 108 AEROSOL, METERED RESPIRATORY (INHALATION) at 14:58

## 2020-11-28 RX ADMIN — ALBUTEROL SULFATE 2 PUFF: 108 AEROSOL, METERED RESPIRATORY (INHALATION) at 17:44

## 2020-11-28 RX ADMIN — Medication 5 MG: at 20:31

## 2020-11-28 RX ADMIN — AMOXICILLIN AND CLAVULANATE POTASSIUM 1 TABLET: 875; 125 TABLET, FILM COATED ORAL at 08:44

## 2020-11-28 RX ADMIN — FAMOTIDINE 20 MG: 20 TABLET ORAL at 05:42

## 2020-11-28 RX ADMIN — OXYCODONE HYDROCHLORIDE AND ACETAMINOPHEN 500 MG: 500 TABLET ORAL at 05:42

## 2020-11-28 RX ADMIN — ENOXAPARIN SODIUM 40 MG: 40 INJECTION SUBCUTANEOUS at 22:45

## 2020-11-28 RX ADMIN — Medication 5000 UNITS: at 08:43

## 2020-11-28 RX ADMIN — ENOXAPARIN SODIUM 40 MG: 40 INJECTION SUBCUTANEOUS at 11:22

## 2020-11-28 RX ADMIN — FUROSEMIDE 20 MG: 20 TABLET ORAL at 08:44

## 2020-11-28 RX ADMIN — OXYCODONE HYDROCHLORIDE AND ACETAMINOPHEN 500 MG: 500 TABLET ORAL at 17:25

## 2020-11-28 RX ADMIN — REMDESIVIR 100 MG: 100 INJECTION, POWDER, LYOPHILIZED, FOR SOLUTION INTRAVENOUS at 22:45

## 2020-11-28 RX ADMIN — Medication 10 ML: at 20:31

## 2020-11-28 RX ADMIN — ALBUTEROL SULFATE 2 PUFF: 108 AEROSOL, METERED RESPIRATORY (INHALATION) at 11:12

## 2020-11-28 RX ADMIN — DEXAMETHASONE SODIUM PHOSPHATE 6 MG: 4 INJECTION, SOLUTION INTRAMUSCULAR; INTRAVENOUS at 08:42

## 2020-11-28 RX ADMIN — THEOPHYLLINE ANHYDROUS 300 MG: 300 CAPSULE, EXTENDED RELEASE ORAL at 08:44

## 2020-11-28 RX ADMIN — Medication 600 MG: at 08:44

## 2020-11-28 RX ADMIN — COLCHICINE 0.6 MG: 0.6 TABLET, FILM COATED ORAL at 08:44

## 2020-11-28 RX ADMIN — AMOXICILLIN AND CLAVULANATE POTASSIUM 1 TABLET: 875; 125 TABLET, FILM COATED ORAL at 20:31

## 2020-11-28 RX ADMIN — OXYCODONE HYDROCHLORIDE AND ACETAMINOPHEN 500 MG: 500 TABLET ORAL at 22:45

## 2020-11-28 RX ADMIN — ZINC SULFATE 220 MG (50 MG) CAPSULE 220 MG: CAPSULE at 08:44

## 2020-11-28 RX ADMIN — ALBUTEROL SULFATE 2 PUFF: 108 AEROSOL, METERED RESPIRATORY (INHALATION) at 06:36

## 2020-11-28 RX ADMIN — AMLODIPINE BESYLATE 5 MG: 5 TABLET ORAL at 08:44

## 2020-11-29 LAB
ALBUMIN SERPL-MCNC: 3.1 G/DL (ref 3.5–5.2)
ALBUMIN/GLOB SERPL: 1.1 G/DL
ALP SERPL-CCNC: 69 U/L (ref 39–117)
ALT SERPL W P-5'-P-CCNC: 64 U/L (ref 1–41)
ANION GAP SERPL CALCULATED.3IONS-SCNC: 9 MMOL/L (ref 5–15)
ANISOCYTOSIS BLD QL: ABNORMAL
AST SERPL-CCNC: 41 U/L (ref 1–40)
BACTERIA SPEC AEROBE CULT: NORMAL
BACTERIA SPEC AEROBE CULT: NORMAL
BILIRUB CONJ SERPL-MCNC: 0.2 MG/DL (ref 0–0.3)
BILIRUB SERPL-MCNC: 0.5 MG/DL (ref 0–1.2)
BUN SERPL-MCNC: 14 MG/DL (ref 8–23)
BUN/CREAT SERPL: 22.6 (ref 7–25)
CALCIUM SPEC-SCNC: 8.2 MG/DL (ref 8.6–10.5)
CHLORIDE SERPL-SCNC: 104 MMOL/L (ref 98–107)
CK SERPL-CCNC: 115 U/L (ref 20–200)
CO2 SERPL-SCNC: 21 MMOL/L (ref 22–29)
CREAT SERPL-MCNC: 0.62 MG/DL (ref 0.76–1.27)
CRP SERPL-MCNC: 2.24 MG/DL (ref 0–0.5)
D DIMER PPP FEU-MCNC: 1.76 MG/L (FEU) (ref 0–0.59)
DEPRECATED RDW RBC AUTO: 42.4 FL (ref 37–54)
ERYTHROCYTE [DISTWIDTH] IN BLOOD BY AUTOMATED COUNT: 14 % (ref 12.3–15.4)
FERRITIN SERPL-MCNC: 950 NG/ML (ref 30–400)
FIBRINOGEN PPP-MCNC: 460 MG/DL (ref 210–450)
GFR SERPL CREATININE-BSD FRML MDRD: 130 ML/MIN/1.73
GLOBULIN UR ELPH-MCNC: 2.8 GM/DL
GLUCOSE SERPL-MCNC: 89 MG/DL (ref 65–99)
HCT VFR BLD AUTO: 42.4 % (ref 37.5–51)
HGB BLD-MCNC: 14.3 G/DL (ref 13–17.7)
LARGE PLATELETS: ABNORMAL
LDH SERPL-CCNC: 369 U/L (ref 135–225)
LYMPHOCYTES # BLD MANUAL: 0.78 10*3/MM3 (ref 0.7–3.1)
LYMPHOCYTES NFR BLD MANUAL: 6 % (ref 5–12)
LYMPHOCYTES NFR BLD MANUAL: 9 % (ref 19.6–45.3)
MCH RBC QN AUTO: 30.1 PG (ref 26.6–33)
MCHC RBC AUTO-ENTMCNC: 33.8 G/DL (ref 31.5–35.7)
MCV RBC AUTO: 89.1 FL (ref 79–97)
METAMYELOCYTES NFR BLD MANUAL: 1 % (ref 0–0)
MONOCYTES # BLD AUTO: 0.52 10*3/MM3 (ref 0.1–0.9)
NEUTROPHILS # BLD AUTO: 7.31 10*3/MM3 (ref 1.7–7)
NEUTROPHILS NFR BLD MANUAL: 82 % (ref 42.7–76)
NEUTS BAND NFR BLD MANUAL: 2 % (ref 0–5)
NRBC SPEC MANUAL: 1 /100 WBC (ref 0–0.2)
PLATELET # BLD AUTO: 474 10*3/MM3 (ref 140–450)
PMV BLD AUTO: 6.4 FL (ref 6–12)
POIKILOCYTOSIS BLD QL SMEAR: ABNORMAL
POTASSIUM SERPL-SCNC: 3.8 MMOL/L (ref 3.5–5.2)
PROCALCITONIN SERPL-MCNC: 0.1 NG/ML (ref 0–0.25)
PROT SERPL-MCNC: 5.9 G/DL (ref 6–8.5)
RBC # BLD AUTO: 4.76 10*6/MM3 (ref 4.14–5.8)
SCAN SLIDE: NORMAL
SODIUM SERPL-SCNC: 134 MMOL/L (ref 136–145)
WBC # BLD AUTO: 8.7 10*3/MM3 (ref 3.4–10.8)
WBC MORPH BLD: NORMAL

## 2020-11-29 PROCEDURE — 83615 LACTATE (LD) (LDH) ENZYME: CPT | Performed by: NURSE PRACTITIONER

## 2020-11-29 PROCEDURE — 94760 N-INVAS EAR/PLS OXIMETRY 1: CPT

## 2020-11-29 PROCEDURE — 85025 COMPLETE CBC W/AUTO DIFF WBC: CPT | Performed by: NURSE PRACTITIONER

## 2020-11-29 PROCEDURE — 94799 UNLISTED PULMONARY SVC/PX: CPT

## 2020-11-29 PROCEDURE — 86140 C-REACTIVE PROTEIN: CPT | Performed by: NURSE PRACTITIONER

## 2020-11-29 PROCEDURE — 82728 ASSAY OF FERRITIN: CPT | Performed by: NURSE PRACTITIONER

## 2020-11-29 PROCEDURE — 85379 FIBRIN DEGRADATION QUANT: CPT | Performed by: HOSPITALIST

## 2020-11-29 PROCEDURE — 85007 BL SMEAR W/DIFF WBC COUNT: CPT | Performed by: NURSE PRACTITIONER

## 2020-11-29 PROCEDURE — 99232 SBSQ HOSP IP/OBS MODERATE 35: CPT | Performed by: HOSPITALIST

## 2020-11-29 PROCEDURE — 84145 PROCALCITONIN (PCT): CPT | Performed by: NURSE PRACTITIONER

## 2020-11-29 PROCEDURE — 82550 ASSAY OF CK (CPK): CPT | Performed by: NURSE PRACTITIONER

## 2020-11-29 PROCEDURE — 82248 BILIRUBIN DIRECT: CPT | Performed by: NURSE PRACTITIONER

## 2020-11-29 PROCEDURE — 85384 FIBRINOGEN ACTIVITY: CPT | Performed by: NURSE PRACTITIONER

## 2020-11-29 PROCEDURE — 25010000002 ENOXAPARIN PER 10 MG: Performed by: NURSE PRACTITIONER

## 2020-11-29 PROCEDURE — 80053 COMPREHEN METABOLIC PANEL: CPT | Performed by: NURSE PRACTITIONER

## 2020-11-29 RX ORDER — ALPRAZOLAM 0.25 MG/1
0.25 TABLET ORAL 2 TIMES DAILY PRN
Status: DISCONTINUED | OUTPATIENT
Start: 2020-11-29 | End: 2020-12-02 | Stop reason: HOSPADM

## 2020-11-29 RX ADMIN — ALBUTEROL SULFATE 2 PUFF: 108 AEROSOL, METERED RESPIRATORY (INHALATION) at 11:18

## 2020-11-29 RX ADMIN — Medication 600 MG: at 20:22

## 2020-11-29 RX ADMIN — Medication 600 MG: at 08:15

## 2020-11-29 RX ADMIN — ZINC SULFATE 220 MG (50 MG) CAPSULE 220 MG: CAPSULE at 08:15

## 2020-11-29 RX ADMIN — COLCHICINE 0.6 MG: 0.6 TABLET, FILM COATED ORAL at 08:16

## 2020-11-29 RX ADMIN — OXYCODONE HYDROCHLORIDE AND ACETAMINOPHEN 500 MG: 500 TABLET ORAL at 22:36

## 2020-11-29 RX ADMIN — OXYCODONE HYDROCHLORIDE AND ACETAMINOPHEN 500 MG: 500 TABLET ORAL at 05:16

## 2020-11-29 RX ADMIN — ENOXAPARIN SODIUM 40 MG: 40 INJECTION SUBCUTANEOUS at 22:36

## 2020-11-29 RX ADMIN — ALBUTEROL SULFATE 2 PUFF: 108 AEROSOL, METERED RESPIRATORY (INHALATION) at 19:25

## 2020-11-29 RX ADMIN — AMLODIPINE BESYLATE 5 MG: 5 TABLET ORAL at 08:16

## 2020-11-29 RX ADMIN — AMOXICILLIN AND CLAVULANATE POTASSIUM 1 TABLET: 875; 125 TABLET, FILM COATED ORAL at 08:15

## 2020-11-29 RX ADMIN — DEXAMETHASONE 6 MG: 6 TABLET ORAL at 08:15

## 2020-11-29 RX ADMIN — Medication 10 ML: at 20:22

## 2020-11-29 RX ADMIN — ALBUTEROL SULFATE 2 PUFF: 108 AEROSOL, METERED RESPIRATORY (INHALATION) at 06:55

## 2020-11-29 RX ADMIN — AMOXICILLIN AND CLAVULANATE POTASSIUM 1 TABLET: 875; 125 TABLET, FILM COATED ORAL at 20:22

## 2020-11-29 RX ADMIN — ENOXAPARIN SODIUM 40 MG: 40 INJECTION SUBCUTANEOUS at 12:28

## 2020-11-29 RX ADMIN — FUROSEMIDE 20 MG: 20 TABLET ORAL at 08:15

## 2020-11-29 RX ADMIN — ALPRAZOLAM 0.25 MG: 0.25 TABLET ORAL at 15:18

## 2020-11-29 RX ADMIN — OXYCODONE HYDROCHLORIDE AND ACETAMINOPHEN 500 MG: 500 TABLET ORAL at 12:28

## 2020-11-29 RX ADMIN — Medication 5 MG: at 20:22

## 2020-11-29 RX ADMIN — Medication 5000 UNITS: at 08:15

## 2020-11-29 RX ADMIN — ASPIRIN 81 MG: 81 TABLET, COATED ORAL at 08:15

## 2020-11-29 RX ADMIN — THEOPHYLLINE ANHYDROUS 300 MG: 300 CAPSULE, EXTENDED RELEASE ORAL at 08:15

## 2020-11-29 RX ADMIN — ALBUTEROL SULFATE 2 PUFF: 108 AEROSOL, METERED RESPIRATORY (INHALATION) at 15:01

## 2020-11-29 RX ADMIN — Medication 10 ML: at 08:15

## 2020-11-29 RX ADMIN — OXYCODONE HYDROCHLORIDE AND ACETAMINOPHEN 500 MG: 500 TABLET ORAL at 17:12

## 2020-11-30 LAB
ALBUMIN SERPL-MCNC: 3.4 G/DL (ref 3.5–5.2)
ALBUMIN/GLOB SERPL: 1.2 G/DL
ALP SERPL-CCNC: 76 U/L (ref 39–117)
ALT SERPL W P-5'-P-CCNC: 61 U/L (ref 1–41)
ANION GAP SERPL CALCULATED.3IONS-SCNC: 10 MMOL/L (ref 5–15)
AST SERPL-CCNC: 29 U/L (ref 1–40)
BILIRUB CONJ SERPL-MCNC: 0.2 MG/DL (ref 0–0.3)
BILIRUB SERPL-MCNC: 0.6 MG/DL (ref 0–1.2)
BUN SERPL-MCNC: 16 MG/DL (ref 8–23)
BUN/CREAT SERPL: 27.6 (ref 7–25)
CALCIUM SPEC-SCNC: 8.2 MG/DL (ref 8.6–10.5)
CHLORIDE SERPL-SCNC: 102 MMOL/L (ref 98–107)
CK SERPL-CCNC: 76 U/L (ref 20–200)
CO2 SERPL-SCNC: 22 MMOL/L (ref 22–29)
CREAT SERPL-MCNC: 0.58 MG/DL (ref 0.76–1.27)
CRP SERPL-MCNC: 2.22 MG/DL (ref 0–0.5)
DEPRECATED RDW RBC AUTO: 42.9 FL (ref 37–54)
EOSINOPHIL # BLD MANUAL: 0.11 10*3/MM3 (ref 0–0.4)
EOSINOPHIL NFR BLD MANUAL: 1 % (ref 0.3–6.2)
ERYTHROCYTE [DISTWIDTH] IN BLOOD BY AUTOMATED COUNT: 13.9 % (ref 12.3–15.4)
FERRITIN SERPL-MCNC: 959 NG/ML (ref 30–400)
GFR SERPL CREATININE-BSD FRML MDRD: 141 ML/MIN/1.73
GLOBULIN UR ELPH-MCNC: 2.9 GM/DL
GLUCOSE SERPL-MCNC: 105 MG/DL (ref 65–99)
HCT VFR BLD AUTO: 44.6 % (ref 37.5–51)
HGB BLD-MCNC: 15.3 G/DL (ref 13–17.7)
LYMPHOCYTES # BLD MANUAL: 0.33 10*3/MM3 (ref 0.7–3.1)
LYMPHOCYTES NFR BLD MANUAL: 13 % (ref 5–12)
LYMPHOCYTES NFR BLD MANUAL: 3 % (ref 19.6–45.3)
MCH RBC QN AUTO: 30.3 PG (ref 26.6–33)
MCHC RBC AUTO-ENTMCNC: 34.2 G/DL (ref 31.5–35.7)
MCV RBC AUTO: 88.6 FL (ref 79–97)
METAMYELOCYTES NFR BLD MANUAL: 3 % (ref 0–0)
MONOCYTES # BLD AUTO: 1.42 10*3/MM3 (ref 0.1–0.9)
NEUTROPHILS # BLD AUTO: 8.39 10*3/MM3 (ref 1.7–7)
NEUTROPHILS NFR BLD MANUAL: 76 % (ref 42.7–76)
NEUTS BAND NFR BLD MANUAL: 1 % (ref 0–5)
PLATELET # BLD AUTO: 516 10*3/MM3 (ref 140–450)
PMV BLD AUTO: 6.6 FL (ref 6–12)
POTASSIUM SERPL-SCNC: 3.9 MMOL/L (ref 3.5–5.2)
PROT SERPL-MCNC: 6.3 G/DL (ref 6–8.5)
RBC # BLD AUTO: 5.03 10*6/MM3 (ref 4.14–5.8)
RBC MORPH BLD: NORMAL
SCAN SLIDE: NORMAL
SMALL PLATELETS BLD QL SMEAR: ABNORMAL
SODIUM SERPL-SCNC: 134 MMOL/L (ref 136–145)
TOXIC GRANULATION: ABNORMAL
VARIANT LYMPHS NFR BLD MANUAL: 3 % (ref 0–5)
WBC # BLD AUTO: 10.9 10*3/MM3 (ref 3.4–10.8)

## 2020-11-30 PROCEDURE — 82728 ASSAY OF FERRITIN: CPT | Performed by: HOSPITALIST

## 2020-11-30 PROCEDURE — 85025 COMPLETE CBC W/AUTO DIFF WBC: CPT | Performed by: NURSE PRACTITIONER

## 2020-11-30 PROCEDURE — 82550 ASSAY OF CK (CPK): CPT | Performed by: NURSE PRACTITIONER

## 2020-11-30 PROCEDURE — 94799 UNLISTED PULMONARY SVC/PX: CPT

## 2020-11-30 PROCEDURE — 25010000002 ENOXAPARIN PER 10 MG: Performed by: NURSE PRACTITIONER

## 2020-11-30 PROCEDURE — 94760 N-INVAS EAR/PLS OXIMETRY 1: CPT

## 2020-11-30 PROCEDURE — 80053 COMPREHEN METABOLIC PANEL: CPT | Performed by: NURSE PRACTITIONER

## 2020-11-30 PROCEDURE — 99233 SBSQ HOSP IP/OBS HIGH 50: CPT | Performed by: INTERNAL MEDICINE

## 2020-11-30 PROCEDURE — 82248 BILIRUBIN DIRECT: CPT | Performed by: NURSE PRACTITIONER

## 2020-11-30 PROCEDURE — 86140 C-REACTIVE PROTEIN: CPT | Performed by: NURSE PRACTITIONER

## 2020-11-30 PROCEDURE — 85007 BL SMEAR W/DIFF WBC COUNT: CPT | Performed by: NURSE PRACTITIONER

## 2020-11-30 RX ADMIN — COLCHICINE 0.6 MG: 0.6 TABLET, FILM COATED ORAL at 08:25

## 2020-11-30 RX ADMIN — AMOXICILLIN AND CLAVULANATE POTASSIUM 1 TABLET: 875; 125 TABLET, FILM COATED ORAL at 08:25

## 2020-11-30 RX ADMIN — ALBUTEROL SULFATE 2 PUFF: 108 AEROSOL, METERED RESPIRATORY (INHALATION) at 19:00

## 2020-11-30 RX ADMIN — Medication 5000 UNITS: at 08:25

## 2020-11-30 RX ADMIN — Medication 10 ML: at 20:07

## 2020-11-30 RX ADMIN — Medication 5 MG: at 20:07

## 2020-11-30 RX ADMIN — Medication 10 ML: at 08:25

## 2020-11-30 RX ADMIN — ZINC SULFATE 220 MG (50 MG) CAPSULE 220 MG: CAPSULE at 08:25

## 2020-11-30 RX ADMIN — Medication 600 MG: at 08:25

## 2020-11-30 RX ADMIN — AMOXICILLIN AND CLAVULANATE POTASSIUM 1 TABLET: 875; 125 TABLET, FILM COATED ORAL at 20:07

## 2020-11-30 RX ADMIN — ENOXAPARIN SODIUM 40 MG: 40 INJECTION SUBCUTANEOUS at 23:01

## 2020-11-30 RX ADMIN — ASPIRIN 81 MG: 81 TABLET, COATED ORAL at 08:25

## 2020-11-30 RX ADMIN — OXYCODONE HYDROCHLORIDE AND ACETAMINOPHEN 500 MG: 500 TABLET ORAL at 23:01

## 2020-11-30 RX ADMIN — OXYCODONE HYDROCHLORIDE AND ACETAMINOPHEN 500 MG: 500 TABLET ORAL at 17:22

## 2020-11-30 RX ADMIN — THEOPHYLLINE ANHYDROUS 300 MG: 300 CAPSULE, EXTENDED RELEASE ORAL at 08:25

## 2020-11-30 RX ADMIN — DEXAMETHASONE 6 MG: 6 TABLET ORAL at 08:25

## 2020-11-30 RX ADMIN — OXYCODONE HYDROCHLORIDE AND ACETAMINOPHEN 500 MG: 500 TABLET ORAL at 05:20

## 2020-11-30 RX ADMIN — OXYCODONE HYDROCHLORIDE AND ACETAMINOPHEN 500 MG: 500 TABLET ORAL at 11:59

## 2020-11-30 RX ADMIN — ALBUTEROL SULFATE 2 PUFF: 108 AEROSOL, METERED RESPIRATORY (INHALATION) at 08:14

## 2020-11-30 RX ADMIN — AMLODIPINE BESYLATE 5 MG: 5 TABLET ORAL at 08:25

## 2020-11-30 RX ADMIN — ALBUTEROL SULFATE 2 PUFF: 108 AEROSOL, METERED RESPIRATORY (INHALATION) at 11:43

## 2020-11-30 RX ADMIN — Medication 600 MG: at 20:07

## 2020-11-30 RX ADMIN — FUROSEMIDE 20 MG: 20 TABLET ORAL at 08:25

## 2020-11-30 RX ADMIN — ENOXAPARIN SODIUM 40 MG: 40 INJECTION SUBCUTANEOUS at 11:59

## 2020-11-30 RX ADMIN — ALBUTEROL SULFATE 2 PUFF: 108 AEROSOL, METERED RESPIRATORY (INHALATION) at 15:42

## 2020-12-01 LAB
ALBUMIN SERPL-MCNC: 3.6 G/DL (ref 3.5–5.2)
ALBUMIN/GLOB SERPL: 1.2 G/DL
ALP SERPL-CCNC: 77 U/L (ref 39–117)
ALT SERPL W P-5'-P-CCNC: 50 U/L (ref 1–41)
ANION GAP SERPL CALCULATED.3IONS-SCNC: 12 MMOL/L (ref 5–15)
AST SERPL-CCNC: 20 U/L (ref 1–40)
BILIRUB CONJ SERPL-MCNC: 0.2 MG/DL (ref 0–0.3)
BILIRUB SERPL-MCNC: 0.6 MG/DL (ref 0–1.2)
BUN SERPL-MCNC: 20 MG/DL (ref 8–23)
BUN/CREAT SERPL: 32.3 (ref 7–25)
BURR CELLS BLD QL SMEAR: ABNORMAL
CALCIUM SPEC-SCNC: 8.7 MG/DL (ref 8.6–10.5)
CHLORIDE SERPL-SCNC: 100 MMOL/L (ref 98–107)
CK SERPL-CCNC: 40 U/L (ref 20–200)
CO2 SERPL-SCNC: 21 MMOL/L (ref 22–29)
CREAT SERPL-MCNC: 0.62 MG/DL (ref 0.76–1.27)
CRP SERPL-MCNC: 2.08 MG/DL (ref 0–0.5)
D DIMER PPP FEU-MCNC: 1.42 MG/L (FEU) (ref 0–0.59)
DEPRECATED RDW RBC AUTO: 42.9 FL (ref 37–54)
EOSINOPHIL # BLD MANUAL: 0.32 10*3/MM3 (ref 0–0.4)
EOSINOPHIL NFR BLD MANUAL: 3 % (ref 0.3–6.2)
ERYTHROCYTE [DISTWIDTH] IN BLOOD BY AUTOMATED COUNT: 14 % (ref 12.3–15.4)
FERRITIN SERPL-MCNC: 1130 NG/ML (ref 30–400)
FIBRINOGEN PPP-MCNC: 553 MG/DL (ref 210–450)
GFR SERPL CREATININE-BSD FRML MDRD: 130 ML/MIN/1.73
GLOBULIN UR ELPH-MCNC: 2.9 GM/DL
GLUCOSE SERPL-MCNC: 94 MG/DL (ref 65–99)
HCT VFR BLD AUTO: 47.9 % (ref 37.5–51)
HGB BLD-MCNC: 16.1 G/DL (ref 13–17.7)
LDH SERPL-CCNC: 339 U/L (ref 135–225)
LYMPHOCYTES # BLD MANUAL: 0.42 10*3/MM3 (ref 0.7–3.1)
LYMPHOCYTES NFR BLD MANUAL: 2 % (ref 5–12)
LYMPHOCYTES NFR BLD MANUAL: 4 % (ref 19.6–45.3)
MCH RBC QN AUTO: 29.8 PG (ref 26.6–33)
MCHC RBC AUTO-ENTMCNC: 33.5 G/DL (ref 31.5–35.7)
MCV RBC AUTO: 88.9 FL (ref 79–97)
METAMYELOCYTES NFR BLD MANUAL: 3 % (ref 0–0)
MONOCYTES # BLD AUTO: 0.21 10*3/MM3 (ref 0.1–0.9)
NEUTROPHILS # BLD AUTO: 9.12 10*3/MM3 (ref 1.7–7)
NEUTROPHILS NFR BLD MANUAL: 80 % (ref 42.7–76)
NEUTS BAND NFR BLD MANUAL: 6 % (ref 0–5)
PLATELET # BLD AUTO: 532 10*3/MM3 (ref 140–450)
PMV BLD AUTO: 6.5 FL (ref 6–12)
POTASSIUM SERPL-SCNC: 3.9 MMOL/L (ref 3.5–5.2)
PROCALCITONIN SERPL-MCNC: 0.08 NG/ML (ref 0–0.25)
PROT SERPL-MCNC: 6.5 G/DL (ref 6–8.5)
RBC # BLD AUTO: 5.39 10*6/MM3 (ref 4.14–5.8)
SCAN SLIDE: NORMAL
SMALL PLATELETS BLD QL SMEAR: ABNORMAL
SODIUM SERPL-SCNC: 133 MMOL/L (ref 136–145)
VARIANT LYMPHS NFR BLD MANUAL: 2 % (ref 0–5)
WBC # BLD AUTO: 10.6 10*3/MM3 (ref 3.4–10.8)
WBC MORPH BLD: NORMAL

## 2020-12-01 PROCEDURE — 99233 SBSQ HOSP IP/OBS HIGH 50: CPT | Performed by: INTERNAL MEDICINE

## 2020-12-01 PROCEDURE — 82550 ASSAY OF CK (CPK): CPT | Performed by: NURSE PRACTITIONER

## 2020-12-01 PROCEDURE — 80053 COMPREHEN METABOLIC PANEL: CPT | Performed by: NURSE PRACTITIONER

## 2020-12-01 PROCEDURE — 86140 C-REACTIVE PROTEIN: CPT | Performed by: NURSE PRACTITIONER

## 2020-12-01 PROCEDURE — 25010000002 ENOXAPARIN PER 10 MG: Performed by: NURSE PRACTITIONER

## 2020-12-01 PROCEDURE — 82728 ASSAY OF FERRITIN: CPT | Performed by: NURSE PRACTITIONER

## 2020-12-01 PROCEDURE — 84145 PROCALCITONIN (PCT): CPT | Performed by: NURSE PRACTITIONER

## 2020-12-01 PROCEDURE — 94799 UNLISTED PULMONARY SVC/PX: CPT

## 2020-12-01 PROCEDURE — 83615 LACTATE (LD) (LDH) ENZYME: CPT | Performed by: NURSE PRACTITIONER

## 2020-12-01 PROCEDURE — 82248 BILIRUBIN DIRECT: CPT | Performed by: NURSE PRACTITIONER

## 2020-12-01 PROCEDURE — 85379 FIBRIN DEGRADATION QUANT: CPT | Performed by: HOSPITALIST

## 2020-12-01 PROCEDURE — 85007 BL SMEAR W/DIFF WBC COUNT: CPT | Performed by: NURSE PRACTITIONER

## 2020-12-01 PROCEDURE — 85025 COMPLETE CBC W/AUTO DIFF WBC: CPT | Performed by: NURSE PRACTITIONER

## 2020-12-01 PROCEDURE — 85384 FIBRINOGEN ACTIVITY: CPT | Performed by: NURSE PRACTITIONER

## 2020-12-01 RX ORDER — DEXAMETHASONE 4 MG/1
4 TABLET ORAL
Status: DISCONTINUED | OUTPATIENT
Start: 2020-12-02 | End: 2020-12-02

## 2020-12-01 RX ADMIN — ENOXAPARIN SODIUM 40 MG: 40 INJECTION SUBCUTANEOUS at 23:22

## 2020-12-01 RX ADMIN — Medication 600 MG: at 08:51

## 2020-12-01 RX ADMIN — OXYCODONE HYDROCHLORIDE AND ACETAMINOPHEN 500 MG: 500 TABLET ORAL at 17:15

## 2020-12-01 RX ADMIN — OXYCODONE HYDROCHLORIDE AND ACETAMINOPHEN 500 MG: 500 TABLET ORAL at 11:26

## 2020-12-01 RX ADMIN — ENOXAPARIN SODIUM 40 MG: 40 INJECTION SUBCUTANEOUS at 11:26

## 2020-12-01 RX ADMIN — Medication 5000 UNITS: at 08:51

## 2020-12-01 RX ADMIN — Medication 600 MG: at 20:04

## 2020-12-01 RX ADMIN — OXYCODONE HYDROCHLORIDE AND ACETAMINOPHEN 500 MG: 500 TABLET ORAL at 23:22

## 2020-12-01 RX ADMIN — ZINC SULFATE 220 MG (50 MG) CAPSULE 220 MG: CAPSULE at 08:51

## 2020-12-01 RX ADMIN — Medication 10 ML: at 20:04

## 2020-12-01 RX ADMIN — FUROSEMIDE 20 MG: 20 TABLET ORAL at 08:51

## 2020-12-01 RX ADMIN — COLCHICINE 0.6 MG: 0.6 TABLET, FILM COATED ORAL at 08:51

## 2020-12-01 RX ADMIN — THEOPHYLLINE ANHYDROUS 300 MG: 300 CAPSULE, EXTENDED RELEASE ORAL at 08:51

## 2020-12-01 RX ADMIN — Medication 5 MG: at 20:04

## 2020-12-01 RX ADMIN — AMLODIPINE BESYLATE 5 MG: 5 TABLET ORAL at 08:51

## 2020-12-01 RX ADMIN — Medication 10 ML: at 08:51

## 2020-12-01 RX ADMIN — ALBUTEROL SULFATE 2 PUFF: 108 AEROSOL, METERED RESPIRATORY (INHALATION) at 18:48

## 2020-12-01 RX ADMIN — ALPRAZOLAM 0.25 MG: 0.25 TABLET ORAL at 20:04

## 2020-12-01 RX ADMIN — ALBUTEROL SULFATE 2 PUFF: 108 AEROSOL, METERED RESPIRATORY (INHALATION) at 06:33

## 2020-12-01 RX ADMIN — OXYCODONE HYDROCHLORIDE AND ACETAMINOPHEN 500 MG: 500 TABLET ORAL at 05:22

## 2020-12-01 RX ADMIN — DEXAMETHASONE 6 MG: 6 TABLET ORAL at 08:51

## 2020-12-01 RX ADMIN — ASPIRIN 81 MG: 81 TABLET, COATED ORAL at 08:51

## 2020-12-01 RX ADMIN — AMOXICILLIN AND CLAVULANATE POTASSIUM 1 TABLET: 875; 125 TABLET, FILM COATED ORAL at 08:51

## 2020-12-01 RX ADMIN — ALBUTEROL SULFATE 2 PUFF: 108 AEROSOL, METERED RESPIRATORY (INHALATION) at 15:30

## 2020-12-01 RX ADMIN — AMOXICILLIN AND CLAVULANATE POTASSIUM 1 TABLET: 875; 125 TABLET, FILM COATED ORAL at 20:04

## 2020-12-01 NOTE — PROGRESS NOTES
Continued Stay Note  SHARON Zaragoza     Patient Name: Braden Javier  MRN: [id number]  Today's Date: 12/1/2020    Admit Date: 11/24/2020    Discharge Plan     Row Name 12/01/20 0920       Plan    Plan  DC Plan: Home with spouse. Current O2 6L hi flow.             Expected Discharge Date and Time     Expected Discharge Date Expected Discharge Time    Dec 2, 2020             Eliz Ryder RN

## 2020-12-01 NOTE — PLAN OF CARE
Goal Outcome Evaluation:  Plan of Care Reviewed With: patient  Progress: improving   Patient has maintained on 6L per PF; no respiratory distress noted; has slept well tonight; no complaints of pain; will continue to monitor patient.

## 2020-12-01 NOTE — PROGRESS NOTES
Daily Progress Note        Pneumonia due to COVID-19 virus    Acute hypoxemic respiratory failure (CMS/HCC)    COPD with acute exacerbation (CMS/HCC)    Essential hypertension      Assessment/Plan:     Acute respiratory failure with hypoxia related to Covid 19     CT PE protocol negative for PE at [facility] emergency room    Hypertension resolved    Plan:    Oxygen titration down to 6 liters  Completed remdesivir  Decadron wean  Anti-inflammatory agents including vitamin C vitamin D zinc and theophylline and colchicine and Mucomyst     Monitor inflammatory markers and labs    Continue antibiotics  p.o. Augmentin total 7 days  anticoagulation per covid protocol,               COVID-19 LAB PANEL     COVID-19 test result  COVID19   Date Value Ref Range Status   11/24/2020 Detected (C) Not Detected - Ref. Range Final       COVID-19 Prognostic lab results  WBC with differential  Results from last 7 days   Lab Units 12/01/20 0358 11/30/20 0301 11/29/20 0427 11/28/20 0259 11/27/20 0405 11/26/20 0323 11/25/20  0010   WBC 10*3/mm3 10.60 10.90* 8.70 7.00 9.50 7.80 4.40   PLATELETS 10*3/mm3 532* 516* 474* 487* 455* 383 341   NEUTROPHIL % %  --   --   --   --  90.1* 89.6* 91.0*   LYMPHOCYTE % %  --   --   --   --  4.0* 3.3* 4.1*   NEUTROS ABS 10*3/mm3 9.12* 8.39* 7.31* 6.23 8.60* 7.00 4.00   LYMPHS ABS 10*3/mm3 0.42* 0.33* 0.78 0.28*  --   --   --      Inflammatory markers  Results from last 7 days   Lab Units 12/01/20 0358 11/30/20 0301 11/29/20 0427 11/28/20 0259 11/27/20  0405 11/26/20  0323 11/25/20  0010 11/24/20  2058   CRP mg/dL 2.08* 2.22* 2.24* 3.15* 4.68* 8.99* 29.66*  --    FERRITIN ng/mL 1,130.00* 959.00* 950.00* 950.00* 1,176.00* 1,301.00* 1,124.00* 1,040.00*   CK TOTAL U/L 40 76 115 142 164 82 95  --    D DIMER QUANT mg/L (FEU) 1.42*  --  1.76*  --  1.98*  --  3.99*  --    PROCALCITONIN ng/mL 0.08  --  0.10  --  0.11  --  0.31* 0.36*   LDH U/L 339*  --  369*  --  448*  --  485* 454*   ALK PHOS  U/L 77 76 69 83 85 68 68 68   AST (SGOT) U/L 20 29 41* 50* 27 23 26 28   ALT (SGPT) U/L 50* 61* 64* 47* 22 17 15 16       Poor COVID-19 outcomes associated with:  Neutrophil:Lymphocyte ratio >3.5  Thrombocytopenia  LFT's >5x upper limit of normal  LDH >400  Subjective         Objective     Vital signs for last 24 hours:  Vitals:    12/01/20 0520 12/01/20 0633 12/01/20 0635 12/01/20 1115   BP: 123/83   122/72   BP Location: Left arm   Left arm   Patient Position: Lying   Lying   Pulse: 80 86 85 81   Resp: 18 18 18 20   Temp: 97.2 °F (36.2 °C)   96.2 °F (35.7 °C)   TempSrc: Oral   Oral   SpO2: 92% 93%  94%   Weight: 77 kg (169 lb 12.1 oz)      Height:           Intake/Output last 3 shifts:  I/O last 3 completed shifts:  In: 240 [P.O.:240]  Out: 800 [Urine:800]  Intake/Output this shift:  No intake/output data recorded.      Radiology  Imaging Results (Last 24 Hours)     ** No results found for the last 24 hours. **          Labs:  Results from last 7 days   Lab Units 12/01/20  0358   WBC 10*3/mm3 10.60   HEMOGLOBIN g/dL 16.1   HEMATOCRIT % 47.9   PLATELETS 10*3/mm3 532*     Results from last 7 days   Lab Units 12/01/20  0358   SODIUM mmol/L 133*   POTASSIUM mmol/L 3.9   CHLORIDE mmol/L 100   CO2 mmol/L 21.0*   BUN mg/dL 20   CREATININE mg/dL 0.62*   CALCIUM mg/dL 8.7   BILIRUBIN mg/dL 0.6   ALK PHOS U/L 77   ALT (SGPT) U/L 50*   AST (SGOT) U/L 20   GLUCOSE mg/dL 94     Results from last 7 days   Lab Units 11/25/20  0738   PH, ARTERIAL pH units 7.445   PO2 ART mm Hg 71.4*   PCO2, ARTERIAL mm Hg 30.8*   HCO3 ART mmol/L 21.2     Results from last 7 days   Lab Units 12/01/20  0358 11/30/20  0301 11/29/20  0427   ALBUMIN g/dL 3.60 3.40* 3.10*     Results from last 7 days   Lab Units 12/01/20  0358 11/30/20  0301 11/29/20  0427   CK TOTAL U/L 40 76 115                           Meds:   SCHEDULE  Acetylcysteine, 600 mg, Oral, BID  albuterol sulfate HFA, 2 puff, Inhalation, 4x Daily - RT  amLODIPine, 5 mg, Oral,  Daily  amoxicillin-clavulanate, 1 tablet, Oral, Q12H  aspirin, 81 mg, Oral, Daily  colchicine, 0.6 mg, Oral, Daily  dexamethasone, 6 mg, Oral, Daily With Breakfast  enoxaparin, 40 mg, Subcutaneous, Q12H  furosemide, 20 mg, Oral, Daily  melatonin, 5 mg, Oral, Nightly  polyethylene glycol, 17 g, Oral, Daily  senna-docusate sodium, 2 tablet, Oral, Once  sodium chloride, 10 mL, Intravenous, Q12H  theophylline, 300 mg, Oral, Q24H  vitamin C, 500 mg, Oral, Q6H  vitamin D3, 5,000 Units, Oral, Daily  zinc sulfate, 220 mg, Oral, Daily      Infusions  Pharmacy to Dose enoxaparin (LOVENOX),       PRNs  •  acetaminophen **OR** acetaminophen **OR** acetaminophen  •  ALPRAZolam  •  cyclobenzaprine  •  influenza vaccine  •  ondansetron **OR** ondansetron  •  Pharmacy to Dose enoxaparin (LOVENOX)  •  sodium chloride    Physical Exam:  Physical Exam  Vitals signs reviewed.   Pulmonary:      Breath sounds: Wheezing present.   Neurological:      Mental Status: He is alert.         ROS  Review of Systems   Constitutional: Positive for activity change and fatigue.   Respiratory: Positive for cough and shortness of breath.

## 2020-12-01 NOTE — PROGRESS NOTES
"      [facility] Medicine Services Daily Progress Note      Hospitalist Team  LOS 6 days      Patient Care Team:  Jose Lambert MD as PCP - General (Family Medicine)    Patient Location: 204/1      Subjective   Subjective     Chief Complaint / Subjective  Fatigue and shortness of air      Brief Synopsis of Hospital Course/HPI      The patient is a 65-year-old male with history of tobacco use that presented to [facility] ED on 11/24/20 for evaluation of about 8 days of fevers, chills, fatigue,  shortness of air and LITTLEJOHN.  T-max at home was 105F and the patient quit smoking about 8 years ago.    At [facility] ED, ABG :  pH 7.59, PCO2 22, PO2 46.  Chest x-ray showed pneumonia.  CTA of the chest ruled out pulmonary embolism.      Date::    11/25/20: Complains of shortness of air and fatigue.  Consulted Dr. Delgado.  11/26/20: Still on 100%FIO2.   11/27/20: Tolerating oral diet.  OOB to chair.  Afebrile.  Still on 100% FiO2.  11/28/20: Feels better.  Wants to go home.  Tolerating diet.  Complains of constipation.  11/29/20: Requiring 15 L.  Tolerating diet.  Explained reasons for continued hospitalization.  Patient competent to make decisions.  Ordered Xanax PRN.  11/30/2020: Wean down to 6 L high flow. Anxious to go home.      Review of Systems   All other systems reviewed and are negative.        Objective   Objective      Vital Signs  Temp:  [97 °F (36.1 °C)-98.6 °F (37 °C)] 97.3 °F (36.3 °C)  Heart Rate:  [] 88  Resp:  [15-23] 20  BP: ()/(73-83) 120/83  Oxygen Therapy  SpO2: 94 %  Pulse Oximetry Type: Continuous  Device (Oxygen Therapy): high-flow nasal cannula  Flow (L/min): 6  Oxygen Concentration (%): 75(to 100)  Flowsheet Rows      First Filed Value   Admission Height  172.7 cm (68\") Documented at 11/24/2020 1900   Admission Weight  81.8 kg (180 lb 5.4 oz) Documented at 11/24/2020 1900        Intake & Output (last 3 days)       11/28 0701 - 11/29 0700 11/29 0701 - " 11/30 0700 11/30 0701 - 12/01 0700    P.O. 1560 420     IV Piggyback       Total Intake(mL/kg) 1560 (18.5) 420 (5)     Urine (mL/kg/hr) 1300 (0.6) 1150 (0.6)     Stool 0 0     Total Output 1300 1150     Net +260 -730            Urine Unmeasured Occurrence 1 x 1 x 1 x    Stool Unmeasured Occurrence 1 x 2 x 1 x        Lines, Drains & Airways    Active LDAs     Name:   Placement date:   Placement time:   Site:   Days:    Peripheral IV 11/24/20 2011 Left Antecubital   11/24/20 2011    Antecubital   less than 1                  Physical Exam:    Physical Exam  HENT:      Head: Normocephalic and atraumatic.      Nose: Nose normal.   Eyes:      General: No scleral icterus.     Extraocular Movements: Extraocular movements intact.      Pupils: Pupils are equal, round, and reactive to light.   Neck:      Musculoskeletal: Normal range of motion.   Cardiovascular:      Rate and Rhythm: Normal rate and regular rhythm.   Pulmonary:      Effort: Tachypnea present.      Breath sounds: Examination of the right-middle field reveals rhonchi. Examination of the left-middle field reveals rhonchi. Examination of the right-lower field reveals rhonchi. Examination of the left-lower field reveals rhonchi. Rhonchi present.   Abdominal:      General: Bowel sounds are normal.      Palpations: Abdomen is soft.   Musculoskeletal: Normal range of motion.   Lymphadenopathy:      Cervical: No cervical adenopathy.   Skin:     General: Skin is warm.   Neurological:      Mental Status: He is alert and oriented to person, place, and time.   Psychiatric:         Mood and Affect: Mood normal.         Speech: Speech normal.         Behavior: Behavior normal.         Cognition and Memory: Cognition normal.         Procedures:              Results Review:     I reviewed the patient's new clinical results.      COVID-19 LAB PANEL     COVID-19 test result  COVID19   Date Value Ref Range Status   11/24/2020 Detected (C) Not Detected - Ref. Range Final        COVID-19 Prognostic lab results  WBC with differential  Results from last 7 days   Lab Units 11/30/20 0301 11/29/20 0427 11/28/20 0259 11/27/20 0405 11/26/20 0323 11/25/20  0010   WBC 10*3/mm3 10.90* 8.70 7.00 9.50 7.80 4.40   PLATELETS 10*3/mm3 516* 474* 487* 455* 383 341   NEUTROPHIL % %  --   --   --  90.1* 89.6* 91.0*   LYMPHOCYTE % %  --   --   --  4.0* 3.3* 4.1*   NEUTROS ABS 10*3/mm3 8.39* 7.31* 6.23 8.60* 7.00 4.00   LYMPHS ABS 10*3/mm3 0.33* 0.78 0.28*  --   --   --      Inflammatory markers  Results from last 7 days   Lab Units 11/30/20 0301 11/29/20 0427 11/28/20 0259 11/27/20 0405 11/26/20 0323 11/25/20 0010 11/24/20  2058   CRP mg/dL 2.22* 2.24* 3.15* 4.68* 8.99* 29.66*  --    FERRITIN ng/mL 959.00* 950.00* 950.00* 1,176.00* 1,301.00* 1,124.00* 1,040.00*   CK TOTAL U/L 76 115 142 164 82 95  --    D DIMER QUANT mg/L (FEU)  --  1.76*  --  1.98*  --  3.99*  --    PROCALCITONIN ng/mL  --  0.10  --  0.11  --  0.31* 0.36*   LDH U/L  --  369*  --  448*  --  485* 454*   ALK PHOS U/L 76 69 83 85 68 68 68   AST (SGOT) U/L 29 41* 50* 27 23 26 28   ALT (SGPT) U/L 61* 64* 47* 22 17 15 16       Poor COVID-19 outcomes associated with:  Neutrophil:Lymphocyte ratio >3.5  Thrombocytopenia  LFT's >5x upper limit of normal  LDH >400    Lab Results (last 24 hours)     Procedure Component Value Units Date/Time    Ferritin [[id number]]  (Abnormal) Collected: 11/30/20 0301    Specimen: Blood Updated: 11/30/20 1106     Ferritin 959.00 ng/mL     Narrative:      Results may be falsely decreased if patient taking Biotin.      CBC & Differential [[id number]]  (Abnormal) Collected: 11/30/20 0301    Specimen: Blood Updated: 11/30/20 0700    Narrative:      The following orders were created for panel order CBC & Differential.  Procedure                               Abnormality         Status                     ---------                               -----------         ------                     Scan Slide[[id number]]                                        Final result               CBC Auto Differential[[id number]]        Abnormal            Final result                 Please view results for these tests on the individual orders.    CBC Auto Differential [[id number]]  (Abnormal) Collected: 11/30/20 0301    Specimen: Blood Updated: 11/30/20 0700     WBC 10.90 10*3/mm3      RBC 5.03 10*6/mm3      Hemoglobin 15.3 g/dL      Hematocrit 44.6 %      MCV 88.6 fL      MCH 30.3 pg      MCHC 34.2 g/dL      RDW 13.9 %      RDW-SD 42.9 fl      MPV 6.6 fL      Platelets 516 10*3/mm3     Scan Slide [[id number]] Collected: 11/30/20 0301    Specimen: Blood Updated: 11/30/20 0700     Scan Slide --     Comment: See Manual Differential Results       Manual Differential [[id number]]  (Abnormal) Collected: 11/30/20 0301    Specimen: Blood Updated: 11/30/20 0700     Neutrophil % 76.0 %      Lymphocyte % 3.0 %      Monocyte % 13.0 %      Eosinophil % 1.0 %      Bands %  1.0 %      Metamyelocyte % 3.0 %      Atypical Lymphocyte % 3.0 %      Neutrophils Absolute 8.39 10*3/mm3      Lymphocytes Absolute 0.33 10*3/mm3      Monocytes Absolute 1.42 10*3/mm3      Eosinophils Absolute 0.11 10*3/mm3      RBC Morphology Normal     Toxic Granulation Slight/1+     Platelet Estimate Increased    Comprehensive Metabolic Panel [[id number]]  (Abnormal) Collected: 11/30/20 0301    Specimen: Blood Updated: 11/30/20 0433     Glucose 105 mg/dL      BUN 16 mg/dL      Creatinine 0.58 mg/dL      Sodium 134 mmol/L      Potassium 3.9 mmol/L      Chloride 102 mmol/L      CO2 22.0 mmol/L      Calcium 8.2 mg/dL      Total Protein 6.3 g/dL      Albumin 3.40 g/dL      ALT (SGPT) 61 U/L      AST (SGOT) 29 U/L      Alkaline Phosphatase 76 U/L      Total Bilirubin 0.6 mg/dL      eGFR Non African Amer 141 mL/min/1.73      Globulin 2.9 gm/dL      A/G Ratio 1.2 g/dL      BUN/Creatinine Ratio 27.6     Anion Gap 10.0 mmol/L     Narrative:      GFR Normal >60  Chronic Kidney Disease <60  Kidney  Failure <15      C-reactive Protein [[id number]]  (Abnormal) Collected: 11/30/20 0301    Specimen: Blood Updated: 11/30/20 0433     C-Reactive Protein 2.22 mg/dL     CK [[id number]]  (Normal) Collected: 11/30/20 0301    Specimen: Blood Updated: 11/30/20 0433     Creatine Kinase 76 U/L     Bilirubin, Direct [[id number]]  (Normal) Collected: 11/30/20 0301    Specimen: Blood Updated: 11/30/20 0433     Bilirubin, Direct 0.2 mg/dL     Blood Culture - Blood, Hand, Left [[id number]] Collected: 11/24/20 2059    Specimen: Blood from Hand, Left Updated: 11/29/20 2116     Blood Culture No growth at 5 days    Blood Culture - Blood, Arm, Right [[id number]] Collected: 11/24/20 2059    Specimen: Blood from Arm, Right Updated: 11/29/20 2116     Blood Culture No growth at 5 days        No results found for: HGBA1C        Results from last 7 days   Lab Units 11/25/20  0738   PH, ARTERIAL pH units 7.445   PO2 ART mm Hg 71.4*   PCO2, ARTERIAL mm Hg 30.8*   HCO3 ART mmol/L 21.2     No results found for: LIPASE  No results found for: CHOL, CHLPL, TRIG, HDL, LDL, LDLDIRECT    No results found for: INTRAOP, PREDX, FINALDX, COMDX    Microbiology Results (last 10 days)     Procedure Component Value - Date/Time    Legionella Antigen, Urine - Urine, Urine, Clean Catch [[id number]]  (Normal) Collected: 11/25/20 1442    Lab Status: Final result Specimen: Urine, Clean Catch Updated: 11/25/20 1737     LEGIONELLA ANTIGEN, URINE Negative    S. Pneumo Ag Urine or CSF - Urine, Urine, Clean Catch [[id number]]  (Normal) Collected: 11/25/20 1442    Lab Status: Final result Specimen: Urine, Clean Catch Updated: 11/25/20 1738     Strep Pneumo Ag Negative    MRSA Screen, PCR (Inpatient) - Swab, Nares [[id number]]  (Normal) Collected: 11/25/20 0833    Lab Status: Final result Specimen: Swab from Nares Updated: 11/25/20 1026     MRSA PCR No MRSA Detected    Respiratory Panel PCR w/COVID-19(SARS-CoV-2) DANIEL/TERI/MIRELA/PAD/COR/MAD/ALIX In-House, NP Swab in UTM/VTM, 3-4 HR  TAT - Swab, Nasopharynx [[id number]]  (Abnormal) Collected: 11/24/20 2131    Lab Status: Final result Specimen: Swab from Nasopharynx Updated: 11/25/20 0257     ADENOVIRUS, PCR Not Detected     Coronavirus 229E Not Detected     Coronavirus HKU1 Not Detected     Coronavirus NL63 Not Detected     Coronavirus OC43 Not Detected     COVID19 Detected     Human Metapneumovirus Not Detected     Human Rhinovirus/Enterovirus Not Detected     Influenza A PCR Not Detected     Influenza A H1 Not Detected     Influenza A H1 2009 PCR Not Detected     Influenza A H3 Not Detected     Influenza B PCR Not Detected     Parainfluenza Virus 1 Not Detected     Parainfluenza Virus 2 Not Detected     Parainfluenza Virus 3 Not Detected     Parainfluenza Virus 4 Not Detected     RSV, PCR Not Detected     Bordetella pertussis pcr Not Detected     Bordetella parapertussis PCR Not Detected     Chlamydophila pneumoniae PCR Not Detected     Mycoplasma pneumo by PCR Not Detected    Narrative:      Fact sheet for providers: https://docs.[employer]/wp-content/uploads/WGV8059-6459-OI0.1-EUA-Provider-Fact-Sheet-3.pdf    Fact sheet for patients: https://docs.[employer]/wp-content/uploads/RSB3554-5197-PU0.1-EUA-Patient-Fact-Sheet-1.pdf    Blood Culture - Blood, Hand, Left [[id number]] Collected: 11/24/20 2059    Lab Status: Final result Specimen: Blood from Hand, Left Updated: 11/29/20 2116     Blood Culture No growth at 5 days    Blood Culture - Blood, Arm, Right [[id number]] Collected: 11/24/20 2059    Lab Status: Final result Specimen: Blood from Arm, Right Updated: 11/29/20 2116     Blood Culture No growth at 5 days          ECG/EMG Results (most recent)     None               Results for orders placed in visit on 12/16/19   SCANNED - ECHOCARDIOGRAM       Xr Chest 1 View    Result Date: 11/25/2020  Diffuse predominantly peripherally located alveolar and interstitial opacities consistent with pneumonia.  Electronically Signed By-Basilio Pizarro MD  On:11/25/2020 7:35 AM This report was finalized on [id number] by  Basilio Pizarro MD.          Xrays, labs reviewed personally by physician.    Medication Review:   I have reviewed the patient's current medication list      Scheduled Meds  Acetylcysteine, 600 mg, Oral, BID  albuterol sulfate HFA, 2 puff, Inhalation, 4x Daily - RT  amLODIPine, 5 mg, Oral, Daily  amoxicillin-clavulanate, 1 tablet, Oral, Q12H  aspirin, 81 mg, Oral, Daily  colchicine, 0.6 mg, Oral, Daily  dexamethasone, 6 mg, Oral, Daily With Breakfast  enoxaparin, 40 mg, Subcutaneous, Q12H  furosemide, 20 mg, Oral, Daily  melatonin, 5 mg, Oral, Nightly  polyethylene glycol, 17 g, Oral, Daily  senna-docusate sodium, 2 tablet, Oral, Once  sodium chloride, 10 mL, Intravenous, Q12H  theophylline, 300 mg, Oral, Q24H  vitamin C, 500 mg, Oral, Q6H  vitamin D3, 5,000 Units, Oral, Daily  zinc sulfate, 220 mg, Oral, Daily        Meds Infusions  Pharmacy to Dose enoxaparin (LOVENOX),         Meds PRN  •  acetaminophen **OR** acetaminophen **OR** acetaminophen  •  ALPRAZolam  •  cyclobenzaprine  •  influenza vaccine  •  ondansetron **OR** ondansetron  •  Pharmacy to Dose enoxaparin (LOVENOX)  •  sodium chloride        Assessment/Plan   Assessment/Plan     Active Hospital Problems    Diagnosis  POA   • **Pneumonia due to COVID-19 virus [U07.1, J12.89]  Yes   • COPD with acute exacerbation (CMS/[facility]) [J44.1]  Yes   • Acute hypoxemic respiratory failure (CMS/[facility]) [J96.01]  Yes   • Essential hypertension [I10]  Yes      Resolved Hospital Problems   No resolved problems to display.       MEDICAL DECISION MAKING COMPLEXITY BY PROBLEM:     COVID-19 infection  -- 11/24/20 -- COVID-19 test positive  -- supplemental O2 to keep sats >93% ; currently on 6 L high flow  -- allow permissive hypoxia to sats >86%  -- pronate patient as tolerated for better oxygenation  -- anti-inflammatory supplements/treatments (per [facility] COVID-19 protocol)     -- Vitamin C 500mg PO q6h     --  Vitamin D3 20,000-60,000 iu x1 (200-500 mcg) then 20,000 iu (200 mcg) weekly     -- Zinc gluconate 100 mg Daily (zinc sulfate 440 mg)     -- Melatonin 10mg nightly     -- B complex vitamins (B12, B6)     -- Famotidine 40mg/day     -- Enoxaparin as per pharmacy protocol     -- Steroids: Decadron 6 mg daily as per pulmonology     -- Remdesivir: Given 11/24/2020 through 11/28/2020 (5 days total)     -- Ivermectin 150-200 mcg/kg daily x2 days   -- Current antibiotics: Augmentin 875 mg  -- Continue to monitor daily labs     -- CMP, CBC, CRP, Ferritin, PCT, D-dimer  -- continue supportive care as needed: antipyretics, breathing treatments, mucolytics, etc.    Acute hypoxemic respiratory failure secondary to COVID-19 pneumonia and COPD exacerbation:  -s/p CTA chest 11/24/20--> PE ruled out  -Continue abx, Decadron, remdesivir, bronchodilators  -Check ABG 11/15/2020  -MRSA screen negative  -Respiratory culture, Legionella and Streptococcus pneumonia urine antigen  -Trend inflammatory markers  -Pulmonology following    Hypertension:  -Continue Norvasc     Erectile dysfunction:  -Hold tadalafil     Chronic pain:   -Hold meloxicam     Patient high risk for decompensation      VTE Prophylaxis -   Mechanical Order History:     None      Pharmalogical Order History:      Ordered     Dose Route Frequency Stop    11/25/20 0511  enoxaparin (LOVENOX) syringe 40 mg      40 mg SC Every 12 Hours --    11/24/20 2205  enoxaparin (LOVENOX) syringe 40 mg     Note to Pharmacy: PLTs = 313 from [place]    40 mg SC Once 11/24/20 2320    11/24/20 2024  Pharmacy to Dose enoxaparin (LOVENOX)     Question:  Indication of use  Answer:  Prophylaxis    -- XX Continuous PRN --                  Code Status -   Code Status and Medical Interventions:   Ordered at: 11/24/20 2024     Code Status:    CPR     Medical Interventions (Level of Support Prior to Arrest):    Full       This patient has been examined wearing appropriate Personal Protective  Equipment. 11/30/20        Discharge Planning    Severe hypoxemia slowly improving but patient asks to go home daily.  Oriented to time place and situation x3.  Xanax ordered for anxiety.    Will likely return home at discharge.    Electronically signed by Halley Millan MD, 11/30/20, 19:56 EST.  [facility] Hospitalist Team

## 2020-12-01 NOTE — PAYOR COMM NOTE
"This is clinical update for Braden Javier, auth# [id number]    EXTENDED AUTHORIZATION PENDING:   PLEASE CALL OR FAX DETERMINATION TO CONTACT BELOW. THANK YOU.     ARNOLDO TATE RN  UTILIZATION REVIEW  Psychiatric  PH: [phone]  FX: [phone]      Braden Javier (65 y.o. Male)     Date of Birth Social Security Number Address Home Phone MRN    1955  [id number] [place] [address] [phone] [id number]    [employer] [facility] Status          None        Admission Date Admission Type Admitting Provider Attending Provider Department, Room/Bed    11/24/20 Urgent Juan Carlos Zhang, Halley Ramírez MD Psychiatric 2A PEDIATRICS, 204/1    Discharge Date Discharge Disposition Discharge Destination                       Attending Provider: Halley Millan MD    Allergies: No Known Allergies    Isolation: Enh Drop/Con   Infection: COVID (confirmed) (11/25/20)   Code Status: CPR    Ht: 172.7 cm (68\")   Wt: 77 kg (169 lb 12.1 oz)    Admission Cmt: None   Principal Problem: Pneumonia due to COVID-19 virus [U07.1,J12.89]                 Active Insurance as of 11/24/2020     Primary Coverage     Payor Plan Insurance Group Employer/Plan Group    [facility] INSURANCE SERVICES [facility] [id number]     Payor Plan Address Payor Plan Phone Number Payor Plan Fax Number Effective Dates    [address]   11/1/2020 - None Entered    [place] [address]       Subscriber Name Subscriber Birth Date Member ID       BRADEN JAVIER 1955 [id number]                 Emergency Contacts      (Rel.) Home Phone Work Phone Mobile Phone    VERA JAVIER (Spouse) [phone] -- [phone]    TIMO BOWSER (Other) -- -- [phone]              Operative/Procedure Notes (last 48 hours) (Notes from 11/29/20 1341 through 12/01/20 1341)    No notes of this type exist for this encounter.          Physician Progress Notes (last 48 hours) (Notes from 11/29/20 1341 through 12/01/20 1341) "      Nguyen Gasca, APRN at 12/01/20 1130          Daily Progress Note        Pneumonia due to COVID-19 virus    Acute hypoxemic respiratory failure (CMS/HCC)    COPD with acute exacerbation (CMS/HCC)    Essential hypertension      Assessment/Plan:     Acute respiratory failure with hypoxia related to Covid 19     CT PE protocol negative for PE at [facility] emergency room    Hypertension resolved    Plan:    Oxygen titration down to 6 liters  Remdesivir  Decadron  Anti-inflammatory agents including vitamin C vitamin D zinc and theophylline and colchicine and Mucomyst     Monitor inflammatory markers and labs    Continue antibiotics  p.o. Augmentin  anticoagulation per covid protocol,               COVID-19 LAB PANEL     COVID-19 test result  COVID19   Date Value Ref Range Status   11/24/2020 Detected (C) Not Detected - Ref. Range Final       COVID-19 Prognostic lab results  WBC with differential  Results from last 7 days   Lab Units 12/01/20  0358 11/30/20 0301 11/29/20 0427 11/28/20 0259 11/27/20 0405 11/26/20 0323 11/25/20  0010   WBC 10*3/mm3 10.60 10.90* 8.70 7.00 9.50 7.80 4.40   PLATELETS 10*3/mm3 532* 516* 474* 487* 455* 383 341   NEUTROPHIL % %  --   --   --   --  90.1* 89.6* 91.0*   LYMPHOCYTE % %  --   --   --   --  4.0* 3.3* 4.1*   NEUTROS ABS 10*3/mm3 9.12* 8.39* 7.31* 6.23 8.60* 7.00 4.00   LYMPHS ABS 10*3/mm3 0.42* 0.33* 0.78 0.28*  --   --   --      Inflammatory markers  Results from last 7 days   Lab Units 12/01/20  0358 11/30/20  0301 11/29/20 0427 11/28/20 0259 11/27/20  0405 11/26/20  0323 11/25/20  0010 11/24/20  2058   CRP mg/dL 2.08* 2.22* 2.24* 3.15* 4.68* 8.99* 29.66*  --    FERRITIN ng/mL 1,130.00* 959.00* 950.00* 950.00* 1,176.00* 1,301.00* 1,124.00* 1,040.00*   CK TOTAL U/L 40 76 115 142 164 82 95  --    D DIMER QUANT mg/L (FEU) 1.42*  --  1.76*  --  1.98*  --  3.99*  --    PROCALCITONIN ng/mL 0.08  --  0.10  --  0.11  --  0.31* 0.36*   LDH U/L 339*  --  369*  --  448*   --  485* 454*   ALK PHOS U/L 77 76 69 83 85 68 68 68   AST (SGOT) U/L 20 29 41* 50* 27 23 26 28   ALT (SGPT) U/L 50* 61* 64* 47* 22 17 15 16       Poor COVID-19 outcomes associated with:  Neutrophil:Lymphocyte ratio >3.5  Thrombocytopenia  LFT's >5x upper limit of normal  LDH >400  Subjective         Objective     Vital signs for last 24 hours:  Vitals:    12/01/20 0520 12/01/20 0633 12/01/20 0635 12/01/20 1115   BP: 123/83   122/72   BP Location: Left arm   Left arm   Patient Position: Lying   Lying   Pulse: 80 86 85 81   Resp: 18 18 18 20   Temp: 97.2 °F (36.2 °C)   96.2 °F (35.7 °C)   TempSrc: Oral   Oral   SpO2: 92% 93%  94%   Weight: 77 kg (169 lb 12.1 oz)      Height:           Intake/Output last 3 shifts:  I/O last 3 completed shifts:  In: 240 [P.O.:240]  Out: 800 [Urine:800]  Intake/Output this shift:  No intake/output data recorded.      Radiology  Imaging Results (Last 24 Hours)     ** No results found for the last 24 hours. **          Labs:  Results from last 7 days   Lab Units 12/01/20  0358   WBC 10*3/mm3 10.60   HEMOGLOBIN g/dL 16.1   HEMATOCRIT % 47.9   PLATELETS 10*3/mm3 532*     Results from last 7 days   Lab Units 12/01/20  0358   SODIUM mmol/L 133*   POTASSIUM mmol/L 3.9   CHLORIDE mmol/L 100   CO2 mmol/L 21.0*   BUN mg/dL 20   CREATININE mg/dL 0.62*   CALCIUM mg/dL 8.7   BILIRUBIN mg/dL 0.6   ALK PHOS U/L 77   ALT (SGPT) U/L 50*   AST (SGOT) U/L 20   GLUCOSE mg/dL 94     Results from last 7 days   Lab Units 11/25/20  0738   PH, ARTERIAL pH units 7.445   PO2 ART mm Hg 71.4*   PCO2, ARTERIAL mm Hg 30.8*   HCO3 ART mmol/L 21.2     Results from last 7 days   Lab Units 12/01/20  0358 11/30/20  0301 11/29/20  0427   ALBUMIN g/dL 3.60 3.40* 3.10*     Results from last 7 days   Lab Units 12/01/20  0358 11/30/20  0301 11/29/20  0427   CK TOTAL U/L 40 76 115                           Meds:   SCHEDULE  Acetylcysteine, 600 mg, Oral, BID  albuterol sulfate HFA, 2 puff, Inhalation, 4x Daily - RT  amLODIPine,  5 mg, Oral, Daily  amoxicillin-clavulanate, 1 tablet, Oral, Q12H  aspirin, 81 mg, Oral, Daily  colchicine, 0.6 mg, Oral, Daily  dexamethasone, 6 mg, Oral, Daily With Breakfast  enoxaparin, 40 mg, Subcutaneous, Q12H  furosemide, 20 mg, Oral, Daily  melatonin, 5 mg, Oral, Nightly  polyethylene glycol, 17 g, Oral, Daily  senna-docusate sodium, 2 tablet, Oral, Once  sodium chloride, 10 mL, Intravenous, Q12H  theophylline, 300 mg, Oral, Q24H  vitamin C, 500 mg, Oral, Q6H  vitamin D3, 5,000 Units, Oral, Daily  zinc sulfate, 220 mg, Oral, Daily      Infusions  Pharmacy to Dose enoxaparin (LOVENOX),       PRNs  •  acetaminophen **OR** acetaminophen **OR** acetaminophen  •  ALPRAZolam  •  cyclobenzaprine  •  influenza vaccine  •  ondansetron **OR** ondansetron  •  Pharmacy to Dose enoxaparin (LOVENOX)  •  sodium chloride    Physical Exam:  Physical Exam  Vitals signs reviewed.   Pulmonary:      Breath sounds: Wheezing present.   Neurological:      Mental Status: He is alert.         ROS  Review of Systems   Constitutional: Positive for activity change and fatigue.   Respiratory: Positive for cough and shortness of breath.                  Electronically signed by Nguyen Gasca APRN at 12/01/20 1133     Halley Millan MD at 11/30/20 1956                [facility] Medicine Services Daily Progress Note      Hospitalist Team  LOS 6 days      Patient Care Team:  Jose Lambert MD as PCP - General (Family Medicine)    Patient Location: 204/1      Subjective   Subjective     Chief Complaint / Subjective  Fatigue and shortness of air      Brief Synopsis of Hospital Course/HPI      The patient is a 65-year-old male with history of tobacco use that presented to [facility] ED on 11/24/20 for evaluation of about 8 days of fevers, chills, fatigue,  shortness of air and LITTLEJOHN.  T-max at home was 105F and the patient quit smoking about 8 years ago.    At [facility] ED, ABG :  pH 7.59, PCO2 22,  "PO2 46.  Chest x-ray showed pneumonia.  CTA of the chest ruled out pulmonary embolism.      Date::    11/25/20: Complains of shortness of air and fatigue.  Consulted Dr. Delgado.  11/26/20: Still on 100%FIO2.   11/27/20: Tolerating oral diet.  OOB to chair.  Afebrile.  Still on 100% FiO2.  11/28/20: Feels better.  Wants to go home.  Tolerating diet.  Complains of constipation.  11/29/20: Requiring 15 L.  Tolerating diet.  Explained reasons for continued hospitalization.  Patient competent to make decisions.  Ordered Xanax PRN.  11/30/2020: Wean down to 6 L high flow. Anxious to go home.      Review of Systems   All other systems reviewed and are negative.        Objective   Objective      Vital Signs  Temp:  [97 °F (36.1 °C)-98.6 °F (37 °C)] 97.3 °F (36.3 °C)  Heart Rate:  [] 88  Resp:  [15-23] 20  BP: ()/(73-83) 120/83  Oxygen Therapy  SpO2: 94 %  Pulse Oximetry Type: Continuous  Device (Oxygen Therapy): high-flow nasal cannula  Flow (L/min): 6  Oxygen Concentration (%): 75(to 100)  Flowsheet Rows      First Filed Value   Admission Height  172.7 cm (68\") Documented at 11/24/2020 1900   Admission Weight  81.8 kg (180 lb 5.4 oz) Documented at 11/24/2020 1900        Intake & Output (last 3 days)       11/28 0701 - 11/29 0700 11/29 0701 - 11/30 0700 11/30 0701 - 12/01 0700    P.O. 1560 420     IV Piggyback       Total Intake(mL/kg) 1560 (18.5) 420 (5)     Urine (mL/kg/hr) 1300 (0.6) 1150 (0.6)     Stool 0 0     Total Output 1300 1150     Net +260 -730            Urine Unmeasured Occurrence 1 x 1 x 1 x    Stool Unmeasured Occurrence 1 x 2 x 1 x        Lines, Drains & Airways    Active LDAs     Name:   Placement date:   Placement time:   Site:   Days:    Peripheral IV 11/24/20 2011 Left Antecubital   11/24/20 2011    Antecubital   less than 1                  Physical Exam:    Physical Exam  HENT:      Head: Normocephalic and atraumatic.      Nose: Nose normal.   Eyes:      General: No scleral icterus.     " Extraocular Movements: Extraocular movements intact.      Pupils: Pupils are equal, round, and reactive to light.   Neck:      Musculoskeletal: Normal range of motion.   Cardiovascular:      Rate and Rhythm: Normal rate and regular rhythm.   Pulmonary:      Effort: Tachypnea present.      Breath sounds: Examination of the right-middle field reveals rhonchi. Examination of the left-middle field reveals rhonchi. Examination of the right-lower field reveals rhonchi. Examination of the left-lower field reveals rhonchi. Rhonchi present.   Abdominal:      General: Bowel sounds are normal.      Palpations: Abdomen is soft.   Musculoskeletal: Normal range of motion.   Lymphadenopathy:      Cervical: No cervical adenopathy.   Skin:     General: Skin is warm.   Neurological:      Mental Status: He is alert and oriented to person, place, and time.   Psychiatric:         Mood and Affect: Mood normal.         Speech: Speech normal.         Behavior: Behavior normal.         Cognition and Memory: Cognition normal.         Procedures:              Results Review:     I reviewed the patient's new clinical results.      COVID-19 LAB PANEL     COVID-19 test result  COVID19   Date Value Ref Range Status   11/24/2020 Detected (C) Not Detected - Ref. Range Final       COVID-19 Prognostic lab results  WBC with differential  Results from last 7 days   Lab Units 11/30/20  0301 11/29/20 0427 11/28/20 0259 11/27/20 0405 11/26/20 0323 11/25/20  0010   WBC 10*3/mm3 10.90* 8.70 7.00 9.50 7.80 4.40   PLATELETS 10*3/mm3 516* 474* 487* 455* 383 341   NEUTROPHIL % %  --   --   --  90.1* 89.6* 91.0*   LYMPHOCYTE % %  --   --   --  4.0* 3.3* 4.1*   NEUTROS ABS 10*3/mm3 8.39* 7.31* 6.23 8.60* 7.00 4.00   LYMPHS ABS 10*3/mm3 0.33* 0.78 0.28*  --   --   --      Inflammatory markers  Results from last 7 days   Lab Units 11/30/20  0301 11/29/20  0427 11/28/20 0259 11/27/20 0405 11/26/20  0323 11/25/20  0010 11/24/20 2058   CRP mg/dL 2.22* 2.24*  3.15* 4.68* 8.99* 29.66*  --    FERRITIN ng/mL 959.00* 950.00* 950.00* 1,176.00* 1,301.00* 1,124.00* 1,040.00*   CK TOTAL U/L 76 115 142 164 82 95  --    D DIMER QUANT mg/L (FEU)  --  1.76*  --  1.98*  --  3.99*  --    PROCALCITONIN ng/mL  --  0.10  --  0.11  --  0.31* 0.36*   LDH U/L  --  369*  --  448*  --  485* 454*   ALK PHOS U/L 76 69 83 85 68 68 68   AST (SGOT) U/L 29 41* 50* 27 23 26 28   ALT (SGPT) U/L 61* 64* 47* 22 17 15 16       Poor COVID-19 outcomes associated with:  Neutrophil:Lymphocyte ratio >3.5  Thrombocytopenia  LFT's >5x upper limit of normal  LDH >400    Lab Results (last 24 hours)     Procedure Component Value Units Date/Time    Ferritin [[id number]]  (Abnormal) Collected: 11/30/20 0301    Specimen: Blood Updated: 11/30/20 1106     Ferritin 959.00 ng/mL     Narrative:      Results may be falsely decreased if patient taking Biotin.      CBC & Differential [[id number]]  (Abnormal) Collected: 11/30/20 0301    Specimen: Blood Updated: 11/30/20 0700    Narrative:      The following orders were created for panel order CBC & Differential.  Procedure                               Abnormality         Status                     ---------                               -----------         ------                     Scan Slide[[id number]]                                       Final result               CBC Auto Differential[[id number]]        Abnormal            Final result                 Please view results for these tests on the individual orders.    CBC Auto Differential [[id number]]  (Abnormal) Collected: 11/30/20 0301    Specimen: Blood Updated: 11/30/20 0700     WBC 10.90 10*3/mm3      RBC 5.03 10*6/mm3      Hemoglobin 15.3 g/dL      Hematocrit 44.6 %      MCV 88.6 fL      MCH 30.3 pg      MCHC 34.2 g/dL      RDW 13.9 %      RDW-SD 42.9 fl      MPV 6.6 fL      Platelets 516 10*3/mm3     Scan Slide [[id number]] Collected: 11/30/20 0301    Specimen: Blood Updated: 11/30/20 0700     Scan Slide --      Comment: See Manual Differential Results       Manual Differential [[id number]]  (Abnormal) Collected: 11/30/20 0301    Specimen: Blood Updated: 11/30/20 0700     Neutrophil % 76.0 %      Lymphocyte % 3.0 %      Monocyte % 13.0 %      Eosinophil % 1.0 %      Bands %  1.0 %      Metamyelocyte % 3.0 %      Atypical Lymphocyte % 3.0 %      Neutrophils Absolute 8.39 10*3/mm3      Lymphocytes Absolute 0.33 10*3/mm3      Monocytes Absolute 1.42 10*3/mm3      Eosinophils Absolute 0.11 10*3/mm3      RBC Morphology Normal     Toxic Granulation Slight/1+     Platelet Estimate Increased    Comprehensive Metabolic Panel [[id number]]  (Abnormal) Collected: 11/30/20 0301    Specimen: Blood Updated: 11/30/20 0433     Glucose 105 mg/dL      BUN 16 mg/dL      Creatinine 0.58 mg/dL      Sodium 134 mmol/L      Potassium 3.9 mmol/L      Chloride 102 mmol/L      CO2 22.0 mmol/L      Calcium 8.2 mg/dL      Total Protein 6.3 g/dL      Albumin 3.40 g/dL      ALT (SGPT) 61 U/L      AST (SGOT) 29 U/L      Alkaline Phosphatase 76 U/L      Total Bilirubin 0.6 mg/dL      eGFR Non African Amer 141 mL/min/1.73      Globulin 2.9 gm/dL      A/G Ratio 1.2 g/dL      BUN/Creatinine Ratio 27.6     Anion Gap 10.0 mmol/L     Narrative:      GFR Normal >60  Chronic Kidney Disease <60  Kidney Failure <15      C-reactive Protein [[id number]]  (Abnormal) Collected: 11/30/20 0301    Specimen: Blood Updated: 11/30/20 0433     C-Reactive Protein 2.22 mg/dL     CK [[id number]]  (Normal) Collected: 11/30/20 0301    Specimen: Blood Updated: 11/30/20 0433     Creatine Kinase 76 U/L     Bilirubin, Direct [[id number]]  (Normal) Collected: 11/30/20 0301    Specimen: Blood Updated: 11/30/20 0433     Bilirubin, Direct 0.2 mg/dL     Blood Culture - Blood, Hand, Left [[id number]] Collected: 11/24/20 2059    Specimen: Blood from Hand, Left Updated: 11/29/20 2116     Blood Culture No growth at 5 days    Blood Culture - Blood, Arm, Right [[id number]] Collected: 11/24/20 2052     Specimen: Blood from Arm, Right Updated: 11/29/20 2116     Blood Culture No growth at 5 days        No results found for: HGBA1C        Results from last 7 days   Lab Units 11/25/20  0738   PH, ARTERIAL pH units 7.445   PO2 ART mm Hg 71.4*   PCO2, ARTERIAL mm Hg 30.8*   HCO3 ART mmol/L 21.2     No results found for: LIPASE  No results found for: CHOL, CHLPL, TRIG, HDL, LDL, LDLDIRECT    No results found for: INTRAOP, PREDX, FINALDX, COMDX    Microbiology Results (last 10 days)     Procedure Component Value - Date/Time    Legionella Antigen, Urine - Urine, Urine, Clean Catch [[id number]]  (Normal) Collected: 11/25/20 1442    Lab Status: Final result Specimen: Urine, Clean Catch Updated: 11/25/20 1737     LEGIONELLA ANTIGEN, URINE Negative    S. Pneumo Ag Urine or CSF - Urine, Urine, Clean Catch [[id number]]  (Normal) Collected: 11/25/20 1442    Lab Status: Final result Specimen: Urine, Clean Catch Updated: 11/25/20 1738     Strep Pneumo Ag Negative    MRSA Screen, PCR (Inpatient) - Swab, Nares [[id number]]  (Normal) Collected: 11/25/20 0833    Lab Status: Final result Specimen: Swab from Nares Updated: 11/25/20 1026     MRSA PCR No MRSA Detected    Respiratory Panel PCR w/COVID-19(SARS-CoV-2) DANIEL/TERI/MIRELA/PAD/COR/MAD/ALIX In-House, NP Swab in UTM/VTM, 3-4 HR TAT - Swab, Nasopharynx [[id number]]  (Abnormal) Collected: 11/24/20 2131    Lab Status: Final result Specimen: Swab from Nasopharynx Updated: 11/25/20 0257     ADENOVIRUS, PCR Not Detected     Coronavirus 229E Not Detected     Coronavirus HKU1 Not Detected     Coronavirus NL63 Not Detected     Coronavirus OC43 Not Detected     COVID19 Detected     Human Metapneumovirus Not Detected     Human Rhinovirus/Enterovirus Not Detected     Influenza A PCR Not Detected     Influenza A H1 Not Detected     Influenza A H1 2009 PCR Not Detected     Influenza A H3 Not Detected     Influenza B PCR Not Detected     Parainfluenza Virus 1 Not Detected     Parainfluenza Virus 2 Not  Detected     Parainfluenza Virus 3 Not Detected     Parainfluenza Virus 4 Not Detected     RSV, PCR Not Detected     Bordetella pertussis pcr Not Detected     Bordetella parapertussis PCR Not Detected     Chlamydophila pneumoniae PCR Not Detected     Mycoplasma pneumo by PCR Not Detected    Narrative:      Fact sheet for providers: https://docs.[employer]/wp-content/uploads/ASI0030-4861-VZ4.1-EUA-Provider-Fact-Sheet-3.pdf    Fact sheet for patients: https://docs.[employer]/wp-content/uploads/SVS1763-3854-SQ8.1-EUA-Patient-Fact-Sheet-1.pdf    Blood Culture - Blood, Hand, Left [[id number]] Collected: 11/24/20 2059    Lab Status: Final result Specimen: Blood from Hand, Left Updated: 11/29/20 2116     Blood Culture No growth at 5 days    Blood Culture - Blood, Arm, Right [[id number]] Collected: 11/24/20 2059    Lab Status: Final result Specimen: Blood from Arm, Right Updated: 11/29/20 2116     Blood Culture No growth at 5 days          ECG/EMG Results (most recent)     None               Results for orders placed in visit on 12/16/19   SCANNED - ECHOCARDIOGRAM       Xr Chest 1 View    Result Date: 11/25/2020  Diffuse predominantly peripherally located alveolar and interstitial opacities consistent with pneumonia.  Electronically Signed By-Basilio Pizarro MD On:11/25/2020 7:35 AM This report was finalized on [id number] by  Basilio Pizarro MD.          Xrays, labs reviewed personally by physician.    Medication Review:   I have reviewed the patient's current medication list      Scheduled Meds  Acetylcysteine, 600 mg, Oral, BID  albuterol sulfate HFA, 2 puff, Inhalation, 4x Daily - RT  amLODIPine, 5 mg, Oral, Daily  amoxicillin-clavulanate, 1 tablet, Oral, Q12H  aspirin, 81 mg, Oral, Daily  colchicine, 0.6 mg, Oral, Daily  dexamethasone, 6 mg, Oral, Daily With Breakfast  enoxaparin, 40 mg, Subcutaneous, Q12H  furosemide, 20 mg, Oral, Daily  melatonin, 5 mg, Oral, Nightly  polyethylene glycol, 17 g, Oral,  Daily  senna-docusate sodium, 2 tablet, Oral, Once  sodium chloride, 10 mL, Intravenous, Q12H  theophylline, 300 mg, Oral, Q24H  vitamin C, 500 mg, Oral, Q6H  vitamin D3, 5,000 Units, Oral, Daily  zinc sulfate, 220 mg, Oral, Daily        Meds Infusions  Pharmacy to Dose enoxaparin (LOVENOX),         Meds PRN  •  acetaminophen **OR** acetaminophen **OR** acetaminophen  •  ALPRAZolam  •  cyclobenzaprine  •  influenza vaccine  •  ondansetron **OR** ondansetron  •  Pharmacy to Dose enoxaparin (LOVENOX)  •  sodium chloride        Assessment/Plan   Assessment/Plan     Active Hospital Problems    Diagnosis  POA   • **Pneumonia due to COVID-19 virus [U07.1, J12.89]  Yes   • COPD with acute exacerbation (CMS/HCC) [J44.1]  Yes   • Acute hypoxemic respiratory failure (CMS/HCC) [J96.01]  Yes   • Essential hypertension [I10]  Yes      Resolved Hospital Problems   No resolved problems to display.       MEDICAL DECISION MAKING COMPLEXITY BY PROBLEM:     COVID-19 infection  -- 11/24/20 -- COVID-19 test positive  -- supplemental O2 to keep sats >93% ; currently on 6 L high flow  -- allow permissive hypoxia to sats >86%  -- pronate patient as tolerated for better oxygenation  -- anti-inflammatory supplements/treatments (per [facility] COVID-19 protocol)     -- Vitamin C 500mg PO q6h     -- Vitamin D3 20,000-60,000 iu x1 (200-500 mcg) then 20,000 iu (200 mcg) weekly     -- Zinc gluconate 100 mg Daily (zinc sulfate 440 mg)     -- Melatonin 10mg nightly     -- B complex vitamins (B12, B6)     -- Famotidine 40mg/day     -- Enoxaparin as per pharmacy protocol     -- Steroids: Decadron 6 mg daily as per pulmonology     -- Remdesivir: Given 11/24/2020 through 11/28/2020 (5 days total)     -- Ivermectin 150-200 mcg/kg daily x2 days   -- Current antibiotics: Augmentin 875 mg  -- Continue to monitor daily labs     -- CMP, CBC, CRP, Ferritin, PCT, D-dimer  -- continue supportive care as needed: antipyretics, breathing treatments, mucolytics,  etc.    Acute hypoxemic respiratory failure secondary to COVID-19 pneumonia and COPD exacerbation:  -s/p CTA chest 11/24/20--> PE ruled out  -Continue abx, Decadron, remdesivir, bronchodilators  -Check ABG 11/15/2020  -MRSA screen negative  -Respiratory culture, Legionella and Streptococcus pneumonia urine antigen  -Trend inflammatory markers  -Pulmonology following    Hypertension:  -Continue Norvasc     Erectile dysfunction:  -Hold tadalafil     Chronic pain:   -Hold meloxicam     Patient high risk for decompensation      VTE Prophylaxis -   Mechanical Order History:     None      Pharmalogical Order History:      Ordered     Dose Route Frequency Stop    11/25/20 0511  enoxaparin (LOVENOX) syringe 40 mg      40 mg SC Every 12 Hours --    11/24/20 2205  enoxaparin (LOVENOX) syringe 40 mg     Note to Pharmacy: PLTs = 313 from [place]    40 mg SC Once 11/24/20 2320    11/24/20 2024  Pharmacy to Dose enoxaparin (LOVENOX)     Question:  Indication of use  Answer:  Prophylaxis    -- XX Continuous PRN --                  Code Status -   Code Status and Medical Interventions:   Ordered at: 11/24/20 2024     Code Status:    CPR     Medical Interventions (Level of Support Prior to Arrest):    Full       This patient has been examined wearing appropriate Personal Protective Equipment. 11/30/20        Discharge Planning    Severe hypoxemia slowly improving but patient asks to go home daily.  Oriented to time place and situation x3.  Xanax ordered for anxiety.    Will likely return home at discharge.    Electronically signed by Halley Millan MD, 11/30/20, 19:56 EST.  [facility] Hospitalist Team        Electronically signed by Halley Millan MD at 11/30/20 [id number]     Al Delgado MD at 11/30/20 1738          Daily Progress Note        Pneumonia due to COVID-19 virus    Acute hypoxemic respiratory failure (CMS/HCC)    COPD with acute exacerbation (CMS/HCC)    Essential hypertension      Assessment/Plan:     Acute  respiratory failure with hypoxia related to Covid 19     CT PE protocol negative for PE at [facility] emergency room    Hypertension        Plan:    Oxygen titration down t  Remdesivir  Decadron  Anti-inflammatory agents including vitamin C vitamin D zinc and theophylline and colchicine and Mucomyst     Monitor inflammatory markers and labs    Continue antibiotics  p.o. Augmentin  anticoagulation per covid protocol,               COVID-19 LAB PANEL     COVID-19 test result  COVID19   Date Value Ref Range Status   11/24/2020 Detected (C) Not Detected - Ref. Range Final       COVID-19 Prognostic lab results  WBC with differential  Results from last 7 days   Lab Units 11/30/20  0301 11/29/20 0427 11/28/20 0259 11/27/20 0405 11/26/20  0323 11/25/20  0010   WBC 10*3/mm3 10.90* 8.70 7.00 9.50 7.80 4.40   PLATELETS 10*3/mm3 516* 474* 487* 455* 383 341   NEUTROPHIL % %  --   --   --  90.1* 89.6* 91.0*   LYMPHOCYTE % %  --   --   --  4.0* 3.3* 4.1*   NEUTROS ABS 10*3/mm3 8.39* 7.31* 6.23 8.60* 7.00 4.00   LYMPHS ABS 10*3/mm3 0.33* 0.78 0.28*  --   --   --      Inflammatory markers  Results from last 7 days   Lab Units 11/30/20  0301 11/29/20 0427 11/28/20 0259 11/27/20 0405 11/26/20  0323 11/25/20  0010 11/24/20  2058   CRP mg/dL 2.22* 2.24* 3.15* 4.68* 8.99* 29.66*  --    FERRITIN ng/mL 959.00* 950.00* 950.00* 1,176.00* 1,301.00* 1,124.00* 1,040.00*   CK TOTAL U/L 76 115 142 164 82 95  --    D DIMER QUANT mg/L (FEU)  --  1.76*  --  1.98*  --  3.99*  --    PROCALCITONIN ng/mL  --  0.10  --  0.11  --  0.31* 0.36*   LDH U/L  --  369*  --  448*  --  485* 454*   ALK PHOS U/L 76 69 83 85 68 68 68   AST (SGOT) U/L 29 41* 50* 27 23 26 28   ALT (SGPT) U/L 61* 64* 47* 22 17 15 16       Poor COVID-19 outcomes associated with:  Neutrophil:Lymphocyte ratio >3.5  Thrombocytopenia  LFT's >5x upper limit of normal  LDH >400  Subjective         Objective     Vital signs for last 24 hours:  Vitals:    11/30/20 1224 11/30/20  1542 11/30/20 1545 11/30/20 1706   BP: 113/73   95/83   BP Location: Left arm   Left arm   Patient Position: Lying   Lying   Pulse: 89 85 90 100   Resp: 23 15 16 19   Temp: 98.6 °F (37 °C)   98.1 °F (36.7 °C)   TempSrc: Oral   Oral   SpO2: 94% 100% 99% 99%   Weight:       Height:           Intake/Output last 3 shifts:  I/O last 3 completed shifts:  In: 900 [P.O.:900]  Out: 2450 [Urine:2450]  Intake/Output this shift:  No intake/output data recorded.      Radiology  Imaging Results (Last 24 Hours)     ** No results found for the last 24 hours. **          Labs:  Results from last 7 days   Lab Units 11/30/20  0301   WBC 10*3/mm3 10.90*   HEMOGLOBIN g/dL 15.3   HEMATOCRIT % 44.6   PLATELETS 10*3/mm3 516*     Results from last 7 days   Lab Units 11/30/20  0301   SODIUM mmol/L 134*   POTASSIUM mmol/L 3.9   CHLORIDE mmol/L 102   CO2 mmol/L 22.0   BUN mg/dL 16   CREATININE mg/dL 0.58*   CALCIUM mg/dL 8.2*   BILIRUBIN mg/dL 0.6   ALK PHOS U/L 76   ALT (SGPT) U/L 61*   AST (SGOT) U/L 29   GLUCOSE mg/dL 105*     Results from last 7 days   Lab Units 11/25/20  0738   PH, ARTERIAL pH units 7.445   PO2 ART mm Hg 71.4*   PCO2, ARTERIAL mm Hg 30.8*   HCO3 ART mmol/L 21.2     Results from last 7 days   Lab Units 11/30/20  0301 11/29/20 0427 11/28/20  0259   ALBUMIN g/dL 3.40* 3.10* 3.20*     Results from last 7 days   Lab Units 11/30/20  0301 11/29/20  0427 11/28/20  0259   CK TOTAL U/L 76 115 142                           Meds:   SCHEDULE  Acetylcysteine, 600 mg, Oral, BID  albuterol sulfate HFA, 2 puff, Inhalation, 4x Daily - RT  amLODIPine, 5 mg, Oral, Daily  amoxicillin-clavulanate, 1 tablet, Oral, Q12H  aspirin, 81 mg, Oral, Daily  colchicine, 0.6 mg, Oral, Daily  dexamethasone, 6 mg, Oral, Daily With Breakfast  enoxaparin, 40 mg, Subcutaneous, Q12H  furosemide, 20 mg, Oral, Daily  melatonin, 5 mg, Oral, Nightly  polyethylene glycol, 17 g, Oral, Daily  senna-docusate sodium, 2 tablet, Oral, Once  sodium chloride, 10 mL,  Intravenous, Q12H  theophylline, 300 mg, Oral, Q24H  vitamin C, 500 mg, Oral, Q6H  vitamin D3, 5,000 Units, Oral, Daily  zinc sulfate, 220 mg, Oral, Daily      Infusions  Pharmacy to Dose enoxaparin (LOVENOX),       PRNs  •  acetaminophen **OR** acetaminophen **OR** acetaminophen  •  ALPRAZolam  •  cyclobenzaprine  •  influenza vaccine  •  ondansetron **OR** ondansetron  •  Pharmacy to Dose enoxaparin (LOVENOX)  •  sodium chloride    Physical Exam:  Physical Exam  Cardiovascular:      Heart sounds: Murmur present.   Pulmonary:      Breath sounds: Wheezing, rhonchi and rales present.         ROS  Review of Systems   Respiratory: Positive for cough and shortness of breath.              Total time spent with patient greater than: 45 Minutes    Electronically signed by Al Delgado MD at 11/30/20 1738     Al Delgado MD at 11/29/20 1506          Daily Progress Note        Pneumonia due to COVID-19 virus    Acute hypoxemic respiratory failure (CMS/HCC)    COPD with acute exacerbation (CMS/HCC)    Essential hypertension      Assessment/Plan:     Acute respiratory failure with hypoxia related to Covid 19     CT PE protocol negative for PE at [facility] emergency room    Hypertension        Plan:    Oxygen titration down to 8 L  Remdesivir  Decadron  Anti-inflammatory agents including vitamin C vitamin D zinc and theophylline and colchicine and Mucomyst     Monitor inflammatory markers and labs    Continue antibiotics  p.o. Augmentin  anticoagulation per covid protocol,               COVID-19 LAB PANEL     COVID-19 test result  COVID19   Date Value Ref Range Status   11/24/2020 Detected (C) Not Detected - Ref. Range Final       COVID-19 Prognostic lab results  WBC with differential  Results from last 7 days   Lab Units 11/29/20  0427 11/28/20  0259 11/27/20  0405 11/26/20  0323 11/25/20  0010   WBC 10*3/mm3 8.70 7.00 9.50 7.80 4.40   PLATELETS 10*3/mm3 474* 487* 455* 383 341   NEUTROPHIL % %  --   --  90.1* 89.6*  91.0*   LYMPHOCYTE % %  --   --  4.0* 3.3* 4.1*   NEUTROS ABS 10*3/mm3 7.31* 6.23 8.60* 7.00 4.00   LYMPHS ABS 10*3/mm3 0.78 0.28*  --   --   --      Inflammatory markers  Results from last 7 days   Lab Units 11/29/20  0427 11/28/20  0259 11/27/20  0405 11/26/20  0323 11/25/20  0010 11/24/20  2058   CRP mg/dL 2.24* 3.15* 4.68* 8.99* 29.66*  --    FERRITIN ng/mL 950.00* 950.00* 1,176.00* 1,301.00* 1,124.00* 1,040.00*   CK TOTAL U/L 115 142 164 82 95  --    D DIMER QUANT mg/L (FEU) 1.76*  --  1.98*  --  3.99*  --    PROCALCITONIN ng/mL 0.10  --  0.11  --  0.31* 0.36*   LDH U/L 369*  --  448*  --  485* 454*   ALK PHOS U/L 69 83 85 68 68 68   AST (SGOT) U/L 41* 50* 27 23 26 28   ALT (SGPT) U/L 64* 47* 22 17 15 16       Poor COVID-19 outcomes associated with:  Neutrophil:Lymphocyte ratio >3.5  Thrombocytopenia  LFT's >5x upper limit of normal  LDH >400  Subjective         Objective     Vital signs for last 24 hours:  Vitals:    11/29/20 0725 11/29/20 1118 11/29/20 1147 11/29/20 1501   BP: 118/78  131/81    BP Location: Right arm  Right arm    Patient Position: Lying  Lying    Pulse: 97 99 97 92   Resp: 16 18 16 17   Temp: 97.2 °F (36.2 °C)  96.7 °F (35.9 °C)    TempSrc: Oral  Oral    SpO2: 91% 93% 93% 94%   Weight:       Height:           Intake/Output last 3 shifts:  I/O last 3 completed shifts:  In: 2290 [P.O.:2040; IV Piggyback:250]  Out: 3100 [Urine:3100]  Intake/Output this shift:  I/O this shift:  In: 180 [P.O.:180]  Out: -       Radiology  Imaging Results (Last 24 Hours)     ** No results found for the last 24 hours. **          Labs:  Results from last 7 days   Lab Units 11/29/20  0427   WBC 10*3/mm3 8.70   HEMOGLOBIN g/dL 14.3   HEMATOCRIT % 42.4   PLATELETS 10*3/mm3 474*     Results from last 7 days   Lab Units 11/29/20  0427   SODIUM mmol/L 134*   POTASSIUM mmol/L 3.8   CHLORIDE mmol/L 104   CO2 mmol/L 21.0*   BUN mg/dL 14   CREATININE mg/dL 0.62*   CALCIUM mg/dL 8.2*   BILIRUBIN mg/dL 0.5   ALK PHOS U/L 69    ALT (SGPT) U/L 64*   AST (SGOT) U/L 41*   GLUCOSE mg/dL 89     Results from last 7 days   Lab Units 11/25/20  0738   PH, ARTERIAL pH units 7.445   PO2 ART mm Hg 71.4*   PCO2, ARTERIAL mm Hg 30.8*   HCO3 ART mmol/L 21.2     Results from last 7 days   Lab Units 11/29/20 0427 11/28/20  0259 11/27/20  0405   ALBUMIN g/dL 3.10* 3.20* 3.10*     Results from last 7 days   Lab Units 11/29/20 0427 11/28/20 0259 11/27/20  0405   CK TOTAL U/L 115 142 164                           Meds:   SCHEDULE  Acetylcysteine, 600 mg, Oral, BID  albuterol sulfate HFA, 2 puff, Inhalation, 4x Daily - RT  amLODIPine, 5 mg, Oral, Daily  amoxicillin-clavulanate, 1 tablet, Oral, Q12H  aspirin, 81 mg, Oral, Daily  colchicine, 0.6 mg, Oral, Daily  dexamethasone, 6 mg, Oral, Daily With Breakfast  enoxaparin, 40 mg, Subcutaneous, Q12H  furosemide, 20 mg, Oral, Daily  melatonin, 5 mg, Oral, Nightly  polyethylene glycol, 17 g, Oral, Daily  senna-docusate sodium, 2 tablet, Oral, Once  sodium chloride, 10 mL, Intravenous, Q12H  theophylline, 300 mg, Oral, Q24H  vitamin C, 500 mg, Oral, Q6H  vitamin D3, 5,000 Units, Oral, Daily  zinc sulfate, 220 mg, Oral, Daily      Infusions  Pharmacy Consult - Remdesivir,   Pharmacy to Dose enoxaparin (LOVENOX),       PRNs  •  acetaminophen **OR** acetaminophen **OR** acetaminophen  •  ALPRAZolam  •  cyclobenzaprine  •  influenza vaccine  •  ondansetron **OR** ondansetron  •  Pharmacy Consult - Remdesivir  •  Pharmacy to Dose enoxaparin (LOVENOX)  •  sodium chloride    Physical Exam:  Physical Exam  Cardiovascular:      Heart sounds: Murmur present.   Pulmonary:      Breath sounds: Wheezing, rhonchi and rales present.         ROS  Review of Systems   Respiratory: Positive for cough and shortness of breath.              Total time spent with patient greater than: 45 Minutes    Electronically signed by Al Delgado MD at 11/29/20 4637     Juan Carlos Zhang, DO at 11/29/20 1502                [facility]  "[facility] Medicine Services Daily Progress Note      Hospitalist Team  LOS 5 days      Patient Care Team:  Jose Lambert MD as PCP - General (Family Medicine)    Patient Location: 204/1      Subjective   Subjective     Chief Complaint / Subjective  Fatigue and shortness of air      Brief Synopsis of Hospital Course/HPI      The patient is a 65-year-old male with history of tobacco use that presented to [facility] ED on 11/24/20 for evaluation of about 8 days of fevers, chills, fatigue,  shortness of air and LITTLEJOHN.  T-max at home was 105F and the patient quit smoking about 8 years ago.    At [facility] ED, ABG :  pH 7.59, PCO2 22, PO2 46.  Chest x-ray showed pneumonia.  CTA of the chest ruled out pulmonary embolism.      Date::    11/25/20: Complains of shortness of air and fatigue.  Consulted Dr. Deglado.  11/26/20: Still on 100%FIO2.   11/27/20: Tolerating oral diet.  OOB to chair.  Afebrile.  Still on 100% FiO2.  11/28/20: Feels better.  Wants to go home.  Tolerating diet.  Complains of constipation.  11/29/20: Requiring 15 L.  Tolerating diet.  Explained reasons for continued hospitalization.  Patient competent to make decisions.  Ordered Xanax PRN.      Review of Systems   All other systems reviewed and are negative.        Objective   Objective      Vital Signs  Temp:  [96.7 °F (35.9 °C)-98.6 °F (37 °C)] 96.7 °F (35.9 °C)  Heart Rate:  [76-99] 97  Resp:  [14-20] 16  BP: (117-131)/(73-85) 131/81  Oxygen Therapy  SpO2: 93 %  Pulse Oximetry Type: Continuous  Device (Oxygen Therapy): high-flow nasal cannula  Flow (L/min): 15  Oxygen Concentration (%): 75(to 100)  Flowsheet Rows      First Filed Value   Admission Height  172.7 cm (68\") Documented at 11/24/2020 1900   Admission Weight  81.8 kg (180 lb 5.4 oz) Documented at 11/24/2020 1900        Intake & Output (last 3 days)       11/26 0701 - 11/27 0700 11/27 0701 - 11/28 0700 11/28 0701 - 11/29 0700 11/29 0701 - 11/30 0700    P.O. 7870 7676 9181 " 180    IV Piggyback  250      Total Intake(mL/kg) 1140 (13.4) 1450 (17.2) 1560 (18.5) 180 (2.1)    Urine (mL/kg/hr) 1900 (0.9) 3725 (1.8) 1300 (0.6)     Stool   0     Total Output 1900 3725 1300     Net -760 -2275 +260 +180            Urine Unmeasured Occurrence   1 x 1 x    Stool Unmeasured Occurrence   1 x 1 x        Lines, Drains & Airways    Active LDAs     Name:   Placement date:   Placement time:   Site:   Days:    Peripheral IV 11/24/20 2011 Left Antecubital   11/24/20 2011    Antecubital   less than 1                  Physical Exam:    Physical Exam  HENT:      Head: Normocephalic and atraumatic.      Nose: Nose normal.   Eyes:      General: No scleral icterus.     Extraocular Movements: Extraocular movements intact.      Pupils: Pupils are equal, round, and reactive to light.   Neck:      Musculoskeletal: Normal range of motion.   Cardiovascular:      Rate and Rhythm: Normal rate and regular rhythm.   Pulmonary:      Effort: Tachypnea present.      Breath sounds: Examination of the right-middle field reveals rhonchi. Examination of the left-middle field reveals rhonchi. Examination of the right-lower field reveals rhonchi. Examination of the left-lower field reveals rhonchi. Rhonchi present.   Abdominal:      General: Bowel sounds are normal.      Palpations: Abdomen is soft.   Musculoskeletal: Normal range of motion.   Lymphadenopathy:      Cervical: No cervical adenopathy.   Skin:     General: Skin is warm.   Neurological:      Mental Status: He is alert and oriented to person, place, and time.   Psychiatric:         Mood and Affect: Mood normal.         Speech: Speech normal.         Behavior: Behavior normal.         Cognition and Memory: Cognition normal.         Procedures:              Results Review:     I reviewed the patient's new clinical results.      Lab Results (last 24 hours)     Procedure Component Value Units Date/Time    Ferritin [[id number]]  (Abnormal) Collected: 11/29/20 0427     Specimen: Blood Updated: 11/29/20 1156     Ferritin 950.00 ng/mL     Narrative:      Results may be falsely decreased if patient taking Biotin.      CBC & Differential [[id number]]  (Abnormal) Collected: 11/29/20 0427    Specimen: Blood Updated: 11/29/20 0924    Narrative:      The following orders were created for panel order CBC & Differential.  Procedure                               Abnormality         Status                     ---------                               -----------         ------                     Scan Slide[[id number]]                                       Final result               CBC Auto Differential[[id number]]        Abnormal            Final result                 Please view results for these tests on the individual orders.    Scan Slide [[id number]] Collected: 11/29/20 0427    Specimen: Blood Updated: 11/29/20 0924     Scan Slide --     Comment: See Manual Differential Results       Manual Differential [[id number]]  (Abnormal) Collected: 11/29/20 0427    Specimen: Blood Updated: 11/29/20 0924     Neutrophil % 82.0 %      Lymphocyte % 9.0 %      Monocyte % 6.0 %      Bands %  2.0 %      Metamyelocyte % 1.0 %      Neutrophils Absolute 7.31 10*3/mm3      Lymphocytes Absolute 0.78 10*3/mm3      Monocytes Absolute 0.52 10*3/mm3      nRBC 1.0 /100 WBC      Anisocytosis Slight/1+     Poikilocytes Slight/1+     WBC Morphology Normal     Large Platelets Slight/1+    CBC Auto Differential [[id number]]  (Abnormal) Collected: 11/29/20 0427    Specimen: Blood Updated: 11/29/20 0924     WBC 8.70 10*3/mm3      RBC 4.76 10*6/mm3      Hemoglobin 14.3 g/dL      Hematocrit 42.4 %      MCV 89.1 fL      MCH 30.1 pg      MCHC 33.8 g/dL      RDW 14.0 %      RDW-SD 42.4 fl      MPV 6.4 fL      Platelets 474 10*3/mm3     Comprehensive Metabolic Panel [[id number]]  (Abnormal) Collected: 11/29/20 0427    Specimen: Blood Updated: 11/29/20 0610     Glucose 89 mg/dL      BUN 14 mg/dL      Creatinine 0.62 mg/dL      Sodium  "134 mmol/L      Potassium 3.8 mmol/L      Chloride 104 mmol/L      CO2 21.0 mmol/L      Calcium 8.2 mg/dL      Total Protein 5.9 g/dL      Albumin 3.10 g/dL      ALT (SGPT) 64 U/L      AST (SGOT) 41 U/L      Alkaline Phosphatase 69 U/L      Total Bilirubin 0.5 mg/dL      eGFR Non African Amer 130 mL/min/1.73      Globulin 2.8 gm/dL      A/G Ratio 1.1 g/dL      BUN/Creatinine Ratio 22.6     Anion Gap 9.0 mmol/L     Narrative:      GFR Normal >60  Chronic Kidney Disease <60  Kidney Failure <15      Procalcitonin [[id number]]  (Normal) Collected: 11/29/20 0427    Specimen: Blood Updated: 11/29/20 0547     Procalcitonin 0.10 ng/mL     Narrative:      As a Marker for Sepsis (Non-Neonates):   1. <0.5 ng/mL represents a low risk of severe sepsis and/or septic shock.  1. >2 ng/mL represents a high risk of severe sepsis and/or septic shock.    As a Marker for Lower Respiratory Tract Infections that require antibiotic therapy:  PCT on Admission     Antibiotic Therapy             6-12 Hrs later  > 0.5                Strongly Recommended            >0.25 - <0.5         Recommended  0.1 - 0.25           Discouraged                   Remeasure/reassess PCT  <0.1                 Strongly Discouraged          Remeasure/reassess PCT      As 28 day mortality risk marker: \"Change in Procalcitonin Result\" (> 80 % or <=80 %) if Day 0 (or Day 1) and Day 4 values are available. Refer to http://www.[facility]-pct-calculator.com/   Change in PCT <=80 %   A decrease of PCT levels below or equal to 80 % defines a positive change in PCT test result representing a higher risk for 28-day all-cause mortality of patients diagnosed with severe sepsis or septic shock.  Change in PCT > 80 %   A decrease of PCT levels of more than 80 % defines a negative change in PCT result representing a lower risk for 28-day all-cause mortality of patients diagnosed with severe sepsis or septic shock.                Results may be falsely decreased if patient taking " Biotin.     CK [[id number]]  (Normal) Collected: 11/29/20 0427    Specimen: Blood Updated: 11/29/20 0545     Creatine Kinase 115 U/L     C-reactive Protein [[id number]]  (Abnormal) Collected: 11/29/20 0427    Specimen: Blood Updated: 11/29/20 0545     C-Reactive Protein 2.24 mg/dL     Lactate Dehydrogenase [[id number]]  (Abnormal) Collected: 11/29/20 0427    Specimen: Blood Updated: 11/29/20 0545      U/L     Bilirubin, Direct [[id number]]  (Normal) Collected: 11/29/20 0427    Specimen: Blood Updated: 11/29/20 0545     Bilirubin, Direct 0.2 mg/dL     Fibrinogen [[id number]]  (Abnormal) Collected: 11/29/20 0427    Specimen: Blood Updated: 11/29/20 0537     Fibrinogen 460 mg/dL     D-dimer, Quantitative [[id number]]  (Abnormal) Collected: 11/29/20 0427    Specimen: Blood Updated: 11/29/20 0537     D-Dimer, Quantitative 1.76 mg/L (FEU)     Narrative:      Reference Range  --------------------------------------------------------------------     < 0.50   Negative Predictive Value  0.50-0.59   Indeterminate    >= 0.60   Probable VTE             A very low percentage of patients with DVT may yield D-Dimer results   below the cut-off of 0.50 mg/L FEU.  This is known to be more   prevalent in patients with distal DVT.             Results of this test should always be interpreted in conjunction with   the patient's medical history, clinical presentation and other   findings.  Clinical diagnosis should not be based on the result of   INNOVANCE D-Dimer alone.    Blood Culture - Blood, Hand, Left [[id number]] Collected: 11/24/20 2059    Specimen: Blood from Hand, Left Updated: 11/28/20 2115     Blood Culture No growth at 4 days    Blood Culture - Blood, Arm, Right [[id number]] Collected: 11/24/20 2059    Specimen: Blood from Arm, Right Updated: 11/28/20 2115     Blood Culture No growth at 4 days        No results found for: HGBA1C        Results from last 7 days   Lab Units 11/25/20  0738   PH, ARTERIAL pH units 7.445   PO2 ART  mm Hg 71.4*   PCO2, ARTERIAL mm Hg 30.8*   HCO3 ART mmol/L 21.2     No results found for: LIPASE  No results found for: CHOL, CHLPL, TRIG, HDL, LDL, LDLDIRECT    No results found for: INTRAOP, PREDX, FINALDX, COMDX    Microbiology Results (last 10 days)     Procedure Component Value - Date/Time    Legionella Antigen, Urine - Urine, Urine, Clean Catch [[id number]]  (Normal) Collected: 11/25/20 1442    Lab Status: Final result Specimen: Urine, Clean Catch Updated: 11/25/20 1737     LEGIONELLA ANTIGEN, URINE Negative    S. Pneumo Ag Urine or CSF - Urine, Urine, Clean Catch [[id number]]  (Normal) Collected: 11/25/20 1442    Lab Status: Final result Specimen: Urine, Clean Catch Updated: 11/25/20 1738     Strep Pneumo Ag Negative    MRSA Screen, PCR (Inpatient) - Swab, Nares [[id number]]  (Normal) Collected: 11/25/20 0833    Lab Status: Final result Specimen: Swab from Nares Updated: 11/25/20 1026     MRSA PCR No MRSA Detected    Respiratory Panel PCR w/COVID-19(SARS-CoV-2) DANIEL/TERI/MIRELA/PAD/COR/MAD/ALIX In-House, NP Swab in UTM/VTM, 3-4 HR TAT - Swab, Nasopharynx [[id number]]  (Abnormal) Collected: 11/24/20 2131    Lab Status: Final result Specimen: Swab from Nasopharynx Updated: 11/25/20 0257     ADENOVIRUS, PCR Not Detected     Coronavirus 229E Not Detected     Coronavirus HKU1 Not Detected     Coronavirus NL63 Not Detected     Coronavirus OC43 Not Detected     COVID19 Detected     Human Metapneumovirus Not Detected     Human Rhinovirus/Enterovirus Not Detected     Influenza A PCR Not Detected     Influenza A H1 Not Detected     Influenza A H1 2009 PCR Not Detected     Influenza A H3 Not Detected     Influenza B PCR Not Detected     Parainfluenza Virus 1 Not Detected     Parainfluenza Virus 2 Not Detected     Parainfluenza Virus 3 Not Detected     Parainfluenza Virus 4 Not Detected     RSV, PCR Not Detected     Bordetella pertussis pcr Not Detected     Bordetella parapertussis PCR Not Detected     Chlamydophila pneumoniae PCR  Not Detected     Mycoplasma pneumo by PCR Not Detected    Narrative:      Fact sheet for providers: https://docs.[employer]/wp-content/uploads/UVE1781-3466-KO3.1-EUA-Provider-Fact-Sheet-3.pdf    Fact sheet for patients: https://docs.[employer]/wp-content/uploads/LOI9131-5188-WV7.1-EUA-Patient-Fact-Sheet-1.pdf    Blood Culture - Blood, Hand, Left [[id number]] Collected: 11/24/20 2059    Lab Status: Preliminary result Specimen: Blood from Hand, Left Updated: 11/28/20 2115     Blood Culture No growth at 4 days    Blood Culture - Blood, Arm, Right [[id number]] Collected: 11/24/20 2059    Lab Status: Preliminary result Specimen: Blood from Arm, Right Updated: 11/28/20 2115     Blood Culture No growth at 4 days          ECG/EMG Results (most recent)     None               Results for orders placed in visit on 12/16/19   SCANNED - ECHOCARDIOGRAM       Xr Chest 1 View    Result Date: 11/25/2020  Diffuse predominantly peripherally located alveolar and interstitial opacities consistent with pneumonia.  Electronically Signed By-Basilio Pizarro MD On:11/25/2020 7:35 AM This report was finalized on [id number] by  Basilio Pizarro MD.          Xrays, labs reviewed personally by physician.    Medication Review:   I have reviewed the patient's current medication list      Scheduled Meds  Acetylcysteine, 600 mg, Oral, BID  albuterol sulfate HFA, 2 puff, Inhalation, 4x Daily - RT  amLODIPine, 5 mg, Oral, Daily  amoxicillin-clavulanate, 1 tablet, Oral, Q12H  aspirin, 81 mg, Oral, Daily  colchicine, 0.6 mg, Oral, Daily  dexamethasone, 6 mg, Oral, Daily With Breakfast  enoxaparin, 40 mg, Subcutaneous, Q12H  furosemide, 20 mg, Oral, Daily  melatonin, 5 mg, Oral, Nightly  polyethylene glycol, 17 g, Oral, Daily  senna-docusate sodium, 2 tablet, Oral, Once  sodium chloride, 10 mL, Intravenous, Q12H  theophylline, 300 mg, Oral, Q24H  vitamin C, 500 mg, Oral, Q6H  vitamin D3, 5,000 Units, Oral, Daily  zinc sulfate, 220 mg, Oral,  Daily        Meds Infusions  Pharmacy Consult - Remdesivir,   Pharmacy to Dose enoxaparin (LOVENOX),         Meds PRN  •  acetaminophen **OR** acetaminophen **OR** acetaminophen  •  ALPRAZolam  •  cyclobenzaprine  •  influenza vaccine  •  ondansetron **OR** ondansetron  •  Pharmacy Consult - Remdesivir  •  Pharmacy to Dose enoxaparin (LOVENOX)  •  sodium chloride        Assessment/Plan   Assessment/Plan     Active Hospital Problems    Diagnosis  POA   • **Pneumonia due to COVID-19 virus [U07.1, J12.89]  Yes     Priority: High   • COPD with acute exacerbation (CMS/[facility]) [J44.1]  Yes     Priority: High   • Acute hypoxemic respiratory failure (CMS/[facility]) [J96.01]  Yes     Priority: High   • Essential hypertension [I10]  Yes     Priority: Medium      Resolved Hospital Problems   No resolved problems to display.       MEDICAL DECISION MAKING COMPLEXITY BY PROBLEM:     Acute hypoxemic respiratory failure secondary to COVID-19 pneumonia and COPD exacerbation:  -s/p CTA chest 11/24/20--> PE ruled out  -Continue abx, Decadron, remdesivir, bronchodilators  -Check ABG 11/15/2020  -MRSA screen negative  -Respiratory culture, Legionella and Streptococcus pneumonia urine antigen  -Trend inflammatory markers  -Consulted Dr. Delgado    Hypertension:  -Continue Norvasc     Erectile dysfunction:  -Hold tadalafil     Chronic pain:   -Hold meloxicam     Patient high risk for decompensation      VTE Prophylaxis -   Mechanical Order History:     None      Pharmalogical Order History:      Ordered     Dose Route Frequency Stop    11/25/20 0511  enoxaparin (LOVENOX) syringe 40 mg      40 mg SC Every 12 Hours --    11/24/20 2205  enoxaparin (LOVENOX) syringe 40 mg     Note to Pharmacy: PLTs = 313 from [place]    40 mg SC Once 11/24/20 2320    11/24/20 2024  Pharmacy to Dose enoxaparin (LOVENOX)     Question:  Indication of use  Answer:  Prophylaxis    -- XX Continuous PRN --                  Code Status -   Code Status and Medical  Interventions:   Ordered at: 11/24/20 2024     Code Status:    CPR     Medical Interventions (Level of Support Prior to Arrest):    Full       This patient has been examined wearing appropriate Personal Protective Equipment and discussed with nursing. 11/29/20        Discharge Planning  Severe hypoxemia slowly improving but patient asks to go home daily.  Oriented to time place and situation x3.  Xanax ordered for anxiety.        Electronically signed by Juan Carlos Zhang DO, 11/29/20, 15:02 EST.  [facility] Hospitalist Team        Electronically signed by Juan Carlos Zhang DO at 11/29/20 1511       Consult Notes (last 48 hours) (Notes from 11/29/20 1342 through 12/01/20 1342)    No notes of this type exist for this encounter.

## 2020-12-01 NOTE — DISCHARGE PLACEMENT REQUEST
"Braden Mason (65 y.o. Male)     Date of Birth Social Security Number Address Home Phone MRN    1955  [address] [place] [address] [phone] [id number]    Tenriism Marital Status          None        Admission Date Admission Type Admitting Provider Attending Provider Department, Room/Bed    20 Urgent Juan Carlos Zhang DO Kapadia, Shefali A, MD [facility] 2A PEDIATRICS,     Discharge Date Discharge Disposition Discharge Destination                       Attending Provider: Halley Millan MD    Allergies: No Known Allergies    Isolation: Enh Drop/Con   Infection: COVID (confirmed) (20)   Code Status: CPR    Ht: 172.7 cm (68\")   Wt: 77 kg (169 lb 12.1 oz)    Admission Cmt: None   Principal Problem: Pneumonia due to COVID-19 virus [U07.1,J12.89]                 Active Insurance as of 2020     Primary Coverage     Payor Plan Insurance Group Employer/Plan Group    [facility] [employer] SERVICES [facility] [id number]     Payor Plan Address Payor Plan Phone Number Payor Plan Fax Number Effective Dates    [address]   2020 - None Entered    [place] [address]       Subscriber Name Subscriber Birth Date Member ID       BRADEN MASON 1955 [id number]                 Emergency Contacts      (Rel.) Home Phone Work Phone Mobile Phone    VERA MASON (Spouse) [phone] -- [phone]    TIMO BOWSER (Other) -- -- [phone]               History & Physical      Serina Buckley FNP at 20     Attestation signed by Juan Carlos Zhang DO at [phone]    I have read the H&P by JIMENA Coronado.  Electronically signed by Juan Carlos Zhang DO, 20, 2:54 PM EST.             Summary: Hypoxia from COVID 19 pneumonia               [facility] Medicine Services      Patient Name: Braden Mason  : 1955  MRN: [id number]  Primary Care Physician: Jose Lambert MD  Date of admission: " 11/24/2020    Patient Care Team:  Jose Lambert MD as PCP - General (Family Medicine)          Subjective   History Present Illness     Chief Complaint: Covid 19 pneumonia with hypoxia    Mr. Javier is a 65 y.o.  presents to [facility] complaining of COVID-19 with hypoxia and shortness of breath.         65-year-old male presents as transfer from [facility] in [place] where he presented with acute shortness of breath which has been worsening over the last 2 days.  The patient had onset of Covid symptoms 8 days ago which was primarily reported as a temperature of 105 °F and had a positive COVID-19 test at that time.  He was maintaining at home until the last few days when he started to experience severe shortness of breath.  The patient denies chest pain and has had minimal cough.  The patient is a former smoker quitting 8 years ago but has a significant pack history smoking 2 packs a day times approximately 45 years.  He does not have home inhalers or controller medications at home.    Laboratory results from [facility] dated 11/24/2020 ABG show pH 7.59, PCO2 22, PO2 46, bicarb 21, base deficit 1, oxygen saturation 90%; lactic acid 1.4; BNP 27; sodium 130, potassium 3.6, chloride 99, CO2 24, glucose 118, BUN 12, creatinine 0.84, ALT 22, AST 47, alkaline phosphate 83, bilirubin total 1.0, total protein 7.1, albumin 3.6, calcium 8.4, , troponin I less than 0.02, D-dimer elevated at 3.47, INR 1.28, PTT 29.6, WBC 7.8, hemoglobin 15.4, platelets 313, neutrophils 92, lymphocytes 4, absolute neutrophils 7.18, absolute lymphocytes 0.31 with a ratio of 26.      Chest x-ray from [facility] in [place] dated 11/24/2020 with comparison film 11/16/2020 impression: #1 mild cardiac megaly.  Pulmonary venous hypertension.  #2 diffuse lung infiltrate or edema right greater than left.  This represents a significant progression since the previous study.  I do not see  any effusions.  At the prior facility the patient received a 500 mg IV azithromycin; dexamethasone 8 mg IV; 82 mg Lovenox; IV contrast; 2 L normal saline    EKG from our facility dated 11/24/2020 showing sinus tachycardia, rate 104, rare PAC; CT PE protocol negative for pulmonary embolism      Review of Systems   Constitution: Positive for chills and fever.   Respiratory: Positive for cough and shortness of breath. Negative for sputum production.    All other systems reviewed and are negative.          Personal History     Past Medical History:   Past Medical History:   Diagnosis Date   • Arthritis    • Cancer (CMS/HCC)     prostate 2012   • DJD (degenerative joint disease)     neck   • Hyperlipidemia    • Hypertension    • Stroke (CMS/HCC)     tia 2019       Surgical History:    History reviewed. No pertinent surgical history.        Family History: family history is not on file. Otherwise pertinent FHx was reviewed and unremarkable.     Social History:  reports that he quit smoking about 8 years ago. He does not have any smokeless tobacco history on file. He reports previous alcohol use. He reports that he does not use drugs.      Medications:  Prior to Admission medications    Medication Sig Start Date End Date Taking? Authorizing Provider   amLODIPine (NORVASC) 5 MG tablet Take 5 mg by mouth Daily.   Yes Galina Valencia MD   aspirin 81 MG EC tablet Take 81 mg by mouth Daily.   Yes Galina Valencia MD   cyclobenzaprine (FLEXERIL) 10 MG tablet Take 10 mg by mouth 3 (Three) Times a Day As Needed for Muscle Spasms.   Yes Galina Valencia MD   meloxicam (MOBIC) 15 MG tablet Take 15 mg by mouth Daily.   Yes Galina Valencia MD   ondansetron ODT (ZOFRAN-ODT) 4 MG disintegrating tablet Place 4 mg on the tongue Every 8 (Eight) Hours As Needed for Nausea or Vomiting.   Yes Galina Valencia MD   tadalafil (ADCIRCA) 20 MG tablet tablet Take 20 mg by mouth Daily. For erectile dysfunction   Yes  Provider, MD Galina   terbinafine (lamiSIL) 250 MG tablet Take 250 mg by mouth Daily.   Yes Provider, MD Galina       Allergies:  No Known Allergies    Objective   Objective     Vital Signs  Temp:  [98.3 °F (36.8 °C)] 98.3 °F (36.8 °C)  Heart Rate:  [95-98] 95  Resp:  [21-24] 24  BP: (133)/(74) 133/74  SpO2:  [83 %-93 %] 93 %  on  Flow (L/min):  [12-30] 30;   Device (Oxygen Therapy): heated;high-flow nasal cannula;humidified  Body mass index is 27.42 kg/m².    Physical Exam  Vitals signs and nursing note reviewed.   Constitutional:       Appearance: He is ill-appearing. He is not toxic-appearing.   HENT:      Head: Normocephalic and atraumatic.      Right Ear: External ear normal.      Left Ear: External ear normal.      Nose: Nose normal. No congestion or rhinorrhea.      Mouth/Throat:      Mouth: Mucous membranes are dry.   Eyes:      General: No scleral icterus.        Right eye: No discharge.         Left eye: No discharge.      Extraocular Movements: Extraocular movements intact.      Conjunctiva/sclera: Conjunctivae normal.      Pupils: Pupils are equal, round, and reactive to light.   Neck:      Musculoskeletal: Normal range of motion and neck supple.   Cardiovascular:      Rate and Rhythm: Normal rate and regular rhythm.      Pulses: Normal pulses.      Heart sounds: Normal heart sounds.   Pulmonary:      Effort: Respiratory distress present.      Breath sounds: Wheezing present.      Comments: Rare scattered wheeze  Abdominal:      General: Bowel sounds are normal. There is no distension.      Palpations: Abdomen is soft.   Musculoskeletal: Normal range of motion.      Right lower leg: No edema.      Left lower leg: No edema.   Skin:     General: Skin is warm and dry.   Neurological:      General: No focal deficit present.      Mental Status: He is alert and oriented to person, place, and time.   Psychiatric:         Mood and Affect: Mood normal.         Behavior: Behavior normal.         Thought  Content: Thought content normal.         Judgment: Judgment normal.         Results Review:  I have personally reviewed most recent cardiac tracings, lab results and radiology images and interpretations and agree with findings.              Invalid input(s):  ALKPHOS  CrCl cannot be calculated (No successful lab value found.).  Brief Urine Lab Results     None          Microbiology Results (last 10 days)     ** No results found for the last 240 hours. **          ECG/EMG Results (most recent)     None               Results for orders placed in visit on 12/16/19   SCANNED - ECHOCARDIOGRAM       No radiology results for the last 7 days      CrCl cannot be calculated (No successful lab value found.).    Assessment/Plan   Assessment/Plan       Active Hospital Problems    Diagnosis  POA   • Hypoxia [R09.02]  Yes      Resolved Hospital Problems   No resolved problems to display.     COVID-19 bilateral pneumonia with hypoxia: Oxygen support as needed; add remdesivir; add IV Decadron; add vitamin C; add zinc; add Pepcid p.o. 20 mg twice daily; add vitamin D 5000 units daily x5 days    --History of heavy smoking until 8 years ago which time he quit smoking    --At time of interview the patient is on 100% nonrebreather with plans to move patient to precision flow    Clinical dehydration without elevated creatinine, severe:  500 normal saline bolus over 2 hours; normal saline at 100    --Patient reports decreased oral intake times multiple days secondary to decreased appetite     --Although COVID-19 patients to be On the drier side the patient has evidence of dehydration    --Monitor fluid volume status closely    Hypertension, chronic with cardiovascular prophylaxis: Continue Norvasc: Continue aspirin    Erectile dysfunction, chronic: Hold tadalafil    Chronic pain: Hold meloxicam    Onychomycosis, chronic: Hold Lamisil      VTE Prophylaxis -   Mechanical Order History:     None      Pharmalogical Order History:      Ordered      Dose Route Frequency Stop    11/24/20 2024  Pharmacy to Dose enoxaparin (LOVENOX)     Question:  Indication of use  Answer:  Prophylaxis    -- XX Continuous PRN --                CODE STATUS:    Code Status and Medical Interventions:   Ordered at: 11/24/20 2024     Code Status:    CPR     Medical Interventions (Level of Support Prior to Arrest):    Full       This patient has been examined wearing appropriate Personal Protective Equipment and patient is COVID-19 positive. 11/24/20      I discussed the patient's findings and my recommendations with patient and nursing staff.      Signature: Electronically signed by JIMENA Coronado, 11/25/20, 3:51 AM EST.      [facility] Hospitalist Team          Electronically signed by Juan Carlos Zhang DO at 11/25/20 1454         Current Facility-Administered Medications   Medication Dose Route Frequency Provider Last Rate Last Admin   • acetaminophen (TYLENOL) tablet 650 mg  650 mg Oral Q4H PRN Serina Buckley FNP   650 mg at 11/26/20 1136    Or   • acetaminophen (TYLENOL) 160 MG/5ML solution 650 mg  650 mg Oral Q4H PRN Serina Buckley FNP        Or   • acetaminophen (TYLENOL) suppository 650 mg  650 mg Rectal Q4H PRN Serina Buckley FNP       • Acetylcysteine capsule 600 mg  600 mg Oral BID Nguyen Gasca APRN   600 mg at 12/01/20 0851   • albuterol sulfate HFA (PROVENTIL HFA;VENTOLIN HFA;PROAIR HFA) inhaler 2 puff  2 puff Inhalation 4x Daily - RT Serina Buckley FNP   2 puff at 12/01/20 1530   • ALPRAZolam (XANAX) tablet 0.25 mg  0.25 mg Oral BID PRN Juan Carlos Zhang DO   0.25 mg at 11/29/20 1518   • amLODIPine (NORVASC) tablet 5 mg  5 mg Oral Daily Serina Buckley FNP   5 mg at 12/01/20 0851   • amoxicillin-clavulanate (AUGMENTIN) 875-125 MG per tablet 1 tablet  1 tablet Oral Q12H Al Delgado MD   1 tablet at 12/01/20 0851   • aspirin EC tablet 81 mg  81 mg Oral Daily Serina Buckley FNP   81 mg at 12/01/20 0851   • colchicine tablet 0.6 mg  0.6 mg Oral Daily Draw, Al,  MD   0.6 mg at 12/01/20 0851   • cyclobenzaprine (FLEXERIL) tablet 10 mg  10 mg Oral TID PRN Serina Buckley FNP   10 mg at 11/27/20 2154   • dexamethasone (DECADRON) tablet 6 mg  6 mg Oral Daily With Breakfast Juan Carlos Zhang DO   6 mg at 12/01/20 0851   • enoxaparin (LOVENOX) syringe 40 mg  40 mg Subcutaneous Q12H Serina Buckley FNP   40 mg at 12/01/20 1126   • furosemide (LASIX) tablet 20 mg  20 mg Oral Daily Juan Carlos Zhang DO   20 mg at 12/01/20 0851   • influenza vac split quad (FLUZONE,FLUARIX,AFLURIA,FLULAVAL) injection 0.5 mL  0.5 mL Intramuscular During Hospitalization Juan Carlos Zhang DO       • melatonin tablet 5 mg  5 mg Oral Nightly Serina Buckley FNP   5 mg at 11/30/20 2007   • ondansetron (ZOFRAN) tablet 4 mg  4 mg Oral Q6H PRN Serina Buckley FNP        Or   • ondansetron (ZOFRAN) injection 4 mg  4 mg Intravenous Q6H PRN Serina Buckley FNP   4 mg at 11/26/20 0847   • Pharmacy to Dose enoxaparin (LOVENOX)   Does not apply Continuous PRN Serina Buckley FNP       • polyethylene glycol (MIRALAX) packet 17 g  17 g Oral Daily Juan Carlos Zhang DO       • sennosides-docusate (PERICOLACE) 8.6-50 MG per tablet 2 tablet  2 tablet Oral Once Juan Carlos Zhang DO       • sodium chloride 0.9 % flush 10 mL  10 mL Intravenous Q12H Serina Buckley FNP   10 mL at 12/01/20 0851   • sodium chloride 0.9 % flush 10 mL  10 mL Intravenous PRN Serina Buckley FNP       • theophylline (MCKAY-24) 24 hr capsule 300 mg  300 mg Oral Q24H Nguyen Gasca APRN   300 mg at 12/01/20 0851   • vitamin C (ASCORBIC ACID) tablet 500 mg  500 mg Oral Q6H Serina Buckley FNP   500 mg at 12/01/20 1126   • vitamin D3 capsule 5,000 Units  5,000 Units Oral Daily Serina Buckley FNP   5,000 Units at 12/01/20 0851   • zinc sulfate (ZINCATE) capsule 220 mg  220 mg Oral Daily Serina Buckley FNP   220 mg at 12/01/20 0851     Referral Orders (last 24 hours) (24h ago, onward)     Start     Ordered    12/01/20 0000  Ambulatory Referral to  Home Health     Question Answer Comment   Face to Face Visit Date: 12/1/2020    Follow-up provider for Plan of Care? I treated the patient in an acute care facility and will not continue treatment after discharge.    Follow-up provider: STEVAN SONG    Reason/Clinical Findings Pneumonia - COVID 19    Describe mobility limitations that make leaving home difficult: weakness, new O2    Nursing/Therapeutic Services Requested Skilled Nursing HHC to eval and treat   Skilled nursing orders: Other HHC to eval and treat   Frequency: 1 Week 1        12/01/20 1559

## 2020-12-01 NOTE — PLAN OF CARE
Goal Outcome Evaluation:  Plan of Care Reviewed With: patient  Progress: improving  Patient is alert and able to make needs known to staff. Patient has no complaints of pain or discomfort noted at this time. Patient mood continue to have improved oxygen needs, 6 liters of HF at this time. Will continue to monitor.   Problem: Adult Inpatient Plan of Care  Goal: Plan of Care Review  Outcome: Ongoing, Progressing

## 2020-12-02 ENCOUNTER — READMISSION MANAGEMENT (OUTPATIENT)
Dept: CALL CENTER | Facility: HOSPITAL | Age: 65
End: 2020-12-02

## 2020-12-02 VITALS
BODY MASS INDEX: 25.49 KG/M2 | DIASTOLIC BLOOD PRESSURE: 87 MMHG | RESPIRATION RATE: 17 BRPM | WEIGHT: 168.21 LBS | HEART RATE: 98 BPM | HEIGHT: 68 IN | SYSTOLIC BLOOD PRESSURE: 125 MMHG | TEMPERATURE: 98.2 F | OXYGEN SATURATION: 94 %

## 2020-12-02 PROBLEM — J96.01 ACUTE HYPOXEMIC RESPIRATORY FAILURE (HCC): Status: RESOLVED | Noted: 2020-11-25 | Resolved: 2020-12-02

## 2020-12-02 LAB
ALBUMIN SERPL-MCNC: 3.4 G/DL (ref 3.5–5.2)
ALBUMIN/GLOB SERPL: 1.2 G/DL
ALP SERPL-CCNC: 80 U/L (ref 39–117)
ALT SERPL W P-5'-P-CCNC: 43 U/L (ref 1–41)
ANION GAP SERPL CALCULATED.3IONS-SCNC: 13 MMOL/L (ref 5–15)
AST SERPL-CCNC: 17 U/L (ref 1–40)
BILIRUB CONJ SERPL-MCNC: 0.2 MG/DL (ref 0–0.3)
BILIRUB SERPL-MCNC: 0.7 MG/DL (ref 0–1.2)
BUN SERPL-MCNC: 22 MG/DL (ref 8–23)
BUN/CREAT SERPL: 29.7 (ref 7–25)
CALCIUM SPEC-SCNC: 9 MG/DL (ref 8.6–10.5)
CHLORIDE SERPL-SCNC: 99 MMOL/L (ref 98–107)
CK SERPL-CCNC: 26 U/L (ref 20–200)
CO2 SERPL-SCNC: 22 MMOL/L (ref 22–29)
CREAT SERPL-MCNC: 0.74 MG/DL (ref 0.76–1.27)
CRP SERPL-MCNC: 1.17 MG/DL (ref 0–0.5)
DEPRECATED RDW RBC AUTO: 44.6 FL (ref 37–54)
ERYTHROCYTE [DISTWIDTH] IN BLOOD BY AUTOMATED COUNT: 14.4 % (ref 12.3–15.4)
FERRITIN SERPL-MCNC: 1220 NG/ML (ref 30–400)
GFR SERPL CREATININE-BSD FRML MDRD: 106 ML/MIN/1.73
GLOBULIN UR ELPH-MCNC: 2.9 GM/DL
GLUCOSE SERPL-MCNC: 83 MG/DL (ref 65–99)
HCT VFR BLD AUTO: 49.8 % (ref 37.5–51)
HGB BLD-MCNC: 16.7 G/DL (ref 13–17.7)
LYMPHOCYTES # BLD MANUAL: 1.32 10*3/MM3 (ref 0.7–3.1)
LYMPHOCYTES NFR BLD MANUAL: 14 % (ref 19.6–45.3)
LYMPHOCYTES NFR BLD MANUAL: 3 % (ref 5–12)
MCH RBC QN AUTO: 29.9 PG (ref 26.6–33)
MCHC RBC AUTO-ENTMCNC: 33.5 G/DL (ref 31.5–35.7)
MCV RBC AUTO: 89.3 FL (ref 79–97)
MONOCYTES # BLD AUTO: 0.28 10*3/MM3 (ref 0.1–0.9)
NEUTROPHILS # BLD AUTO: 7.8 10*3/MM3 (ref 1.7–7)
NEUTROPHILS NFR BLD MANUAL: 82 % (ref 42.7–76)
NEUTS BAND NFR BLD MANUAL: 1 % (ref 0–5)
PLAT MORPH BLD: NORMAL
PLATELET # BLD AUTO: 500 10*3/MM3 (ref 140–450)
PMV BLD AUTO: 6.5 FL (ref 6–12)
POIKILOCYTOSIS BLD QL SMEAR: ABNORMAL
POTASSIUM SERPL-SCNC: 4.2 MMOL/L (ref 3.5–5.2)
PROT SERPL-MCNC: 6.3 G/DL (ref 6–8.5)
RBC # BLD AUTO: 5.58 10*6/MM3 (ref 4.14–5.8)
SCAN SLIDE: NORMAL
SODIUM SERPL-SCNC: 134 MMOL/L (ref 136–145)
TOXIC GRANULATION: ABNORMAL
WBC # BLD AUTO: 9.4 10*3/MM3 (ref 3.4–10.8)

## 2020-12-02 PROCEDURE — 85007 BL SMEAR W/DIFF WBC COUNT: CPT | Performed by: NURSE PRACTITIONER

## 2020-12-02 PROCEDURE — 25010000002 CYANOCOBALAMIN PER 1000 MCG: Performed by: INTERNAL MEDICINE

## 2020-12-02 PROCEDURE — 63710000001 DEXAMETHASONE PER 0.25 MG: Performed by: INTERNAL MEDICINE

## 2020-12-02 PROCEDURE — 85025 COMPLETE CBC W/AUTO DIFF WBC: CPT | Performed by: NURSE PRACTITIONER

## 2020-12-02 PROCEDURE — 86140 C-REACTIVE PROTEIN: CPT | Performed by: NURSE PRACTITIONER

## 2020-12-02 PROCEDURE — 25010000002 ENOXAPARIN PER 10 MG: Performed by: NURSE PRACTITIONER

## 2020-12-02 PROCEDURE — 80053 COMPREHEN METABOLIC PANEL: CPT | Performed by: NURSE PRACTITIONER

## 2020-12-02 PROCEDURE — 82248 BILIRUBIN DIRECT: CPT | Performed by: NURSE PRACTITIONER

## 2020-12-02 PROCEDURE — 94799 UNLISTED PULMONARY SVC/PX: CPT

## 2020-12-02 PROCEDURE — 99239 HOSP IP/OBS DSCHRG MGMT >30: CPT | Performed by: INTERNAL MEDICINE

## 2020-12-02 PROCEDURE — 82550 ASSAY OF CK (CPK): CPT | Performed by: NURSE PRACTITIONER

## 2020-12-02 PROCEDURE — 82728 ASSAY OF FERRITIN: CPT | Performed by: NURSE PRACTITIONER

## 2020-12-02 RX ORDER — THIAMINE MONONITRATE (VIT B1) 100 MG
100 TABLET ORAL DAILY
Status: DISCONTINUED | OUTPATIENT
Start: 2020-12-02 | End: 2020-12-02 | Stop reason: HOSPADM

## 2020-12-02 RX ORDER — FUROSEMIDE 20 MG/1
20 TABLET ORAL DAILY
Qty: 30 TABLET | Refills: 0 | Status: SHIPPED | OUTPATIENT
Start: 2020-12-03

## 2020-12-02 RX ORDER — DEXAMETHASONE 4 MG/1
2 TABLET ORAL
Status: DISCONTINUED | OUTPATIENT
Start: 2020-12-03 | End: 2020-12-02 | Stop reason: HOSPADM

## 2020-12-02 RX ORDER — THIAMINE MONONITRATE (VIT B1) 100 MG
100 TABLET ORAL DAILY
Qty: 14 TABLET | Refills: 0 | Status: SHIPPED | OUTPATIENT
Start: 2020-12-03 | End: 2020-12-17

## 2020-12-02 RX ORDER — MULTIVITAMIN WITH IRON
100 TABLET ORAL DAILY
Status: DISCONTINUED | OUTPATIENT
Start: 2020-12-02 | End: 2020-12-02 | Stop reason: HOSPADM

## 2020-12-02 RX ORDER — METHYLPREDNISOLONE 4 MG/1
TABLET ORAL
Qty: 21 TABLET | Refills: 0 | Status: SHIPPED | OUTPATIENT
Start: 2020-12-02

## 2020-12-02 RX ORDER — PYRIDOXINE HCL (VITAMIN B6) 100 MG
100 TABLET ORAL DAILY
Qty: 14 TABLET | Refills: 0 | Status: SHIPPED | OUTPATIENT
Start: 2020-12-03 | End: 2020-12-17

## 2020-12-02 RX ORDER — FAMOTIDINE 20 MG/1
20 TABLET, FILM COATED ORAL
Qty: 28 TABLET | Refills: 0 | Status: SHIPPED | OUTPATIENT
Start: 2020-12-02 | End: 2020-12-16

## 2020-12-02 RX ORDER — FAMOTIDINE 20 MG/1
20 TABLET, FILM COATED ORAL
Status: DISCONTINUED | OUTPATIENT
Start: 2020-12-02 | End: 2020-12-02 | Stop reason: HOSPADM

## 2020-12-02 RX ORDER — CHOLECALCIFEROL (VITAMIN D3) 125 MCG
10 CAPSULE ORAL NIGHTLY
Qty: 28 TABLET | Refills: 0 | Status: SHIPPED | OUTPATIENT
Start: 2020-12-02 | End: 2020-12-16

## 2020-12-02 RX ORDER — CYANOCOBALAMIN 1000 UG/ML
1000 INJECTION, SOLUTION INTRAMUSCULAR; SUBCUTANEOUS
Status: DISCONTINUED | OUTPATIENT
Start: 2020-12-02 | End: 2020-12-02 | Stop reason: HOSPADM

## 2020-12-02 RX ORDER — VITAMIN B COMPLEX
1 CAPSULE ORAL 2 TIMES DAILY
Qty: 28 CAPSULE | Refills: 0 | Status: SHIPPED | OUTPATIENT
Start: 2020-12-02 | End: 2020-12-16

## 2020-12-02 RX ORDER — POLYETHYLENE GLYCOL 3350 17 G/17G
17 POWDER, FOR SOLUTION ORAL DAILY
Qty: 507 G | Refills: 0 | Status: SHIPPED | OUTPATIENT
Start: 2020-12-02

## 2020-12-02 RX ORDER — CHOLECALCIFEROL (VITAMIN D3) 125 MCG
10 CAPSULE ORAL NIGHTLY
Status: DISCONTINUED | OUTPATIENT
Start: 2020-12-02 | End: 2020-12-02 | Stop reason: HOSPADM

## 2020-12-02 RX ORDER — AMOXICILLIN AND CLAVULANATE POTASSIUM 875; 125 MG/1; MG/1
1 TABLET, FILM COATED ORAL EVERY 12 HOURS SCHEDULED
Qty: 1 TABLET | Refills: 0 | Status: SHIPPED | OUTPATIENT
Start: 2020-12-02 | End: 2020-12-03

## 2020-12-02 RX ORDER — ZINC SULFATE 50(220)MG
220 CAPSULE ORAL DAILY
Qty: 14 CAPSULE | Refills: 0 | Status: SHIPPED | OUTPATIENT
Start: 2020-12-03 | End: 2020-12-17

## 2020-12-02 RX ADMIN — Medication 100 MG: at 11:53

## 2020-12-02 RX ADMIN — Medication 600 MG: at 08:18

## 2020-12-02 RX ADMIN — OXYCODONE HYDROCHLORIDE AND ACETAMINOPHEN 500 MG: 500 TABLET ORAL at 11:53

## 2020-12-02 RX ADMIN — THEOPHYLLINE ANHYDROUS 300 MG: 300 CAPSULE, EXTENDED RELEASE ORAL at 08:18

## 2020-12-02 RX ADMIN — FUROSEMIDE 20 MG: 20 TABLET ORAL at 08:18

## 2020-12-02 RX ADMIN — ZINC SULFATE 220 MG (50 MG) CAPSULE 220 MG: CAPSULE at 08:18

## 2020-12-02 RX ADMIN — AMLODIPINE BESYLATE 5 MG: 5 TABLET ORAL at 08:18

## 2020-12-02 RX ADMIN — ALBUTEROL SULFATE 2 PUFF: 108 AEROSOL, METERED RESPIRATORY (INHALATION) at 14:48

## 2020-12-02 RX ADMIN — ENOXAPARIN SODIUM 40 MG: 40 INJECTION SUBCUTANEOUS at 11:53

## 2020-12-02 RX ADMIN — CYANOCOBALAMIN 1000 MCG: 1000 INJECTION, SOLUTION INTRAMUSCULAR; SUBCUTANEOUS at 11:53

## 2020-12-02 RX ADMIN — AMOXICILLIN AND CLAVULANATE POTASSIUM 1 TABLET: 875; 125 TABLET, FILM COATED ORAL at 08:18

## 2020-12-02 RX ADMIN — ALBUTEROL SULFATE 2 PUFF: 108 AEROSOL, METERED RESPIRATORY (INHALATION) at 10:54

## 2020-12-02 RX ADMIN — COLCHICINE 0.6 MG: 0.6 TABLET, FILM COATED ORAL at 08:18

## 2020-12-02 RX ADMIN — ALBUTEROL SULFATE 2 PUFF: 108 AEROSOL, METERED RESPIRATORY (INHALATION) at 06:49

## 2020-12-02 RX ADMIN — ASPIRIN 81 MG: 81 TABLET, COATED ORAL at 08:18

## 2020-12-02 RX ADMIN — OXYCODONE HYDROCHLORIDE AND ACETAMINOPHEN 500 MG: 500 TABLET ORAL at 05:51

## 2020-12-02 RX ADMIN — DEXAMETHASONE 4 MG: 4 TABLET ORAL at 08:18

## 2020-12-02 RX ADMIN — Medication 10 ML: at 08:18

## 2020-12-02 RX ADMIN — Medication 5000 UNITS: at 08:18

## 2020-12-02 NOTE — PROGRESS NOTES
Daily Progress Note        Pneumonia due to COVID-19 virus    Acute hypoxemic respiratory failure (CMS/HCC)    COPD with acute exacerbation (CMS/HCC)    Essential hypertension      Assessment/Plan:     Acute respiratory failure with hypoxia related to Covid 19 improved on 2 liters     CT PE protocol negative for PE at [facility] emergency room    Hypertension resolved    Plan:    Will obtain walking oximetry to determine home oxygen needs  Completed remdesivir  Decadron wean  Anti-inflammatory agents including vitamin C vitamin D zinc and theophylline and colchicine and Mucomyst     Augmentin total 7 days completed  Anticoagulation per covid protocol               COVID-19 LAB PANEL     COVID-19 test result  COVID19   Date Value Ref Range Status   11/24/2020 Detected (C) Not Detected - Ref. Range Final       COVID-19 Prognostic lab results  WBC with differential  Results from last 7 days   Lab Units 12/02/20  0629 12/01/20 0358 11/30/20 0301 11/29/20 0427 11/28/20 0259 11/27/20 0405 11/26/20  0323   WBC 10*3/mm3 9.40 10.60 10.90* 8.70 7.00 9.50 7.80   PLATELETS 10*3/mm3 500* 532* 516* 474* 487* 455* 383   NEUTROPHIL % %  --   --   --   --   --  90.1* 89.6*   LYMPHOCYTE % %  --   --   --   --   --  4.0* 3.3*   NEUTROS ABS 10*3/mm3 7.80* 9.12* 8.39* 7.31* 6.23 8.60* 7.00   LYMPHS ABS 10*3/mm3 1.32 0.42* 0.33* 0.78 0.28*  --   --      Inflammatory markers  Results from last 7 days   Lab Units 12/02/20  0629 12/01/20 0358 11/30/20 0301 11/29/20 0427 11/28/20  0259 11/27/20  0405 11/26/20  0323   CRP mg/dL  --  2.08* 2.22* 2.24* 3.15* 4.68* 8.99*   FERRITIN ng/mL 1,220.00* 1,130.00* 959.00* 950.00* 950.00* 1,176.00* 1,301.00*   CK TOTAL U/L 26 40 76 115 142 164 82   D DIMER QUANT mg/L (FEU)  --  1.42*  --  1.76*  --  1.98*  --    PROCALCITONIN ng/mL  --  0.08  --  0.10  --  0.11  --    LDH U/L  --  339*  --  369*  --  448*  --    ALK PHOS U/L 80 77 76 69 83 85 68   AST (SGOT) U/L 17 20 29 41* 50* 27 23    ALT (SGPT) U/L 43* 50* 61* 64* 47* 22 17       Poor COVID-19 outcomes associated with:  Neutrophil:Lymphocyte ratio >3.5  Thrombocytopenia  LFT's >5x upper limit of normal  LDH >400  Subjective         Objective     Vital signs for last 24 hours:  Vitals:    12/02/20 0310 12/02/20 0649 12/02/20 0818 12/02/20 1054   BP: 127/82      BP Location: Left arm      Patient Position: Lying      Pulse: 78 80 105    Resp: 16 16  18   Temp: 97.3 °F (36.3 °C)      TempSrc: Oral      SpO2: 97% 100%  90%   Weight: 76.3 kg (168 lb 3.4 oz)      Height:           Intake/Output last 3 shifts:  I/O last 3 completed shifts:  In: 1200 [P.O.:1200]  Out: 1200 [Urine:1200]  Intake/Output this shift:  No intake/output data recorded.      Radiology  Imaging Results (Last 24 Hours)     ** No results found for the last 24 hours. **          Labs:  Results from last 7 days   Lab Units 12/02/20  0629   WBC 10*3/mm3 9.40   HEMOGLOBIN g/dL 16.7   HEMATOCRIT % 49.8   PLATELETS 10*3/mm3 500*     Results from last 7 days   Lab Units 12/02/20  0629   SODIUM mmol/L 134*   POTASSIUM mmol/L 4.2   CHLORIDE mmol/L 99   CO2 mmol/L 22.0   BUN mg/dL 22   CREATININE mg/dL 0.74*   CALCIUM mg/dL 9.0   BILIRUBIN mg/dL 0.7   ALK PHOS U/L 80   ALT (SGPT) U/L 43*   AST (SGOT) U/L 17   GLUCOSE mg/dL 83         Results from last 7 days   Lab Units 12/02/20  0629 12/01/20 0358 11/30/20  0301   ALBUMIN g/dL 3.40* 3.60 3.40*     Results from last 7 days   Lab Units 12/02/20  0629 12/01/20  0358 11/30/20  0301   CK TOTAL U/L 26 40 76                           Meds:   SCHEDULE  Acetylcysteine, 600 mg, Oral, BID  albuterol sulfate HFA, 2 puff, Inhalation, 4x Daily - RT  amLODIPine, 5 mg, Oral, Daily  amoxicillin-clavulanate, 1 tablet, Oral, Q12H  aspirin, 81 mg, Oral, Daily  colchicine, 0.6 mg, Oral, Daily  cyanocobalamin, 1,000 mcg, Intramuscular, Q28 Days  dexamethasone, 4 mg, Oral, Daily With Breakfast  enoxaparin, 40 mg, Subcutaneous, Q12H  famotidine, 20 mg, Oral,  BID AC  furosemide, 20 mg, Oral, Daily  melatonin, 10 mg, Oral, Nightly  polyethylene glycol, 17 g, Oral, Daily  senna-docusate sodium, 2 tablet, Oral, Once  sodium chloride, 10 mL, Intravenous, Q12H  theophylline, 300 mg, Oral, Q24H  thiamine, 100 mg, Oral, Daily  vitamin B-6, 100 mg, Oral, Daily  vitamin C, 500 mg, Oral, Q6H  vitamin D3, 5,000 Units, Oral, Daily  zinc sulfate, 220 mg, Oral, Daily      Infusions  Pharmacy to Dose enoxaparin (LOVENOX),       PRNs  •  acetaminophen **OR** acetaminophen **OR** acetaminophen  •  ALPRAZolam  •  cyclobenzaprine  •  influenza vaccine  •  ondansetron **OR** ondansetron  •  Pharmacy to Dose enoxaparin (LOVENOX)  •  sodium chloride    Physical Exam:  Physical Exam  Vitals signs reviewed.   Pulmonary:      Breath sounds: Wheezing present.   Neurological:      Mental Status: He is alert.         ROS  Review of Systems   Respiratory: Positive for cough.

## 2020-12-02 NOTE — PROGRESS NOTES
Continued Stay Note   Nik     Patient Name: Braden Javier  MRN: [id number]  Today's Date: 12/2/2020    Admit Date: 11/24/2020    Discharge Plan     Row Name 12/02/20 1214       Plan    Plan  DC Plan: Referral to [facility], still pending. Home O2 per [facility], order sent 1214.    Plan Comments  Expect DC today.        Chart review only.       Expected Discharge Date and Time     Expected Discharge Date Expected Discharge Time    Dec 2, 2020             Eliz Ryder RN

## 2020-12-02 NOTE — PROGRESS NOTES
"      [facility] Medicine Services Daily Progress Note      Hospitalist Team  LOS 7 days      Patient Care Team:  Jose Lambert MD as PCP - General (Family Medicine)    Patient Location: 204/1      Subjective   Subjective     Chief Complaint / Subjective  Fatigue and shortness of air      Brief Synopsis of Hospital Course/HPI      The patient is a 65-year-old male with history of tobacco use that presented to [place] ED on 11/24/20 for evaluation of about 8 days of fevers, chills, fatigue,  shortness of air and LITTLEJOHN.  T-max at home was 105F and the patient quit smoking about 8 years ago.    At [facility], ABG :  pH 7.59, PCO2 22, PO2 46.  Chest x-ray showed pneumonia.  CTA of the chest ruled out pulmonary embolism.      Date::    11/25/20: Complains of shortness of air and fatigue.  Consulted Dr. Delgado.  11/26/20: Still on 100%FIO2.   11/27/20: Tolerating oral diet.  OOB to chair.  Afebrile.  Still on 100% FiO2.  11/28/20: Feels better.  Wants to go home.  Tolerating diet.  Complains of constipation.  11/29/20: Requiring 15 L.  Tolerating diet.  Explained reasons for continued hospitalization.  Patient competent to make decisions.  Ordered Xanax PRN.  11/30/2020: Wean down to 6 L high flow. Anxious to go home.  12/1/2020: Remains on 6 L high flow oxygen at this time.      Review of Systems   All other systems reviewed and are negative.        Objective   Objective      Vital Signs  Temp:  [95.7 °F (35.4 °C)-97.5 °F (36.4 °C)] 97.5 °F (36.4 °C)  Heart Rate:  [] 97  Resp:  [17-26] 18  BP: (121-131)/(72-92) 124/79  Oxygen Therapy  SpO2: 96 %  Pulse Oximetry Type: Continuous  Device (Oxygen Therapy): high-flow nasal cannula  Flow (L/min): 6  Oxygen Concentration (%): 75(to 100)  Flowsheet Rows      First Filed Value   Admission Height  172.7 cm (68\") Documented at 11/24/2020 1900   Admission Weight  81.8 kg (180 lb 5.4 oz) Documented at 11/24/2020 1900        Intake & " Output (last 3 days)       11/29 0701 - 11/30 0700 11/30 0701 - 12/01 0700 12/01 0701 - 12/02 0700    P.O. 420 240 720    Total Intake(mL/kg) 420 (5) 240 (3.1) 720 (9.4)    Urine (mL/kg/hr) 1150 (0.6) 500 (0.3)     Stool 0 0     Total Output 1150 500     Net -730 -260 +720           Urine Unmeasured Occurrence 1 x 2 x     Stool Unmeasured Occurrence 2 x 2 x         Lines, Drains & Airways    Active LDAs     Name:   Placement date:   Placement time:   Site:   Days:    Peripheral IV 11/24/20 2011 Left Antecubital   11/24/20 2011    Antecubital   less than 1                  Physical Exam:    Physical Exam  HENT:      Head: Normocephalic and atraumatic.      Nose: Nose normal.   Eyes:      General: No scleral icterus.     Extraocular Movements: Extraocular movements intact.      Pupils: Pupils are equal, round, and reactive to light.   Neck:      Musculoskeletal: Normal range of motion.   Cardiovascular:      Rate and Rhythm: Normal rate and regular rhythm.   Pulmonary:      Effort: Tachypnea present.      Breath sounds: Examination of the right-middle field reveals rhonchi. Examination of the left-middle field reveals rhonchi. Examination of the right-lower field reveals rhonchi. Examination of the left-lower field reveals rhonchi. Rhonchi present.   Abdominal:      General: Bowel sounds are normal.      Palpations: Abdomen is soft.   Musculoskeletal: Normal range of motion.   Lymphadenopathy:      Cervical: No cervical adenopathy.   Skin:     General: Skin is warm.   Neurological:      Mental Status: He is alert and oriented to person, place, and time.   Psychiatric:         Mood and Affect: Mood normal.         Speech: Speech normal.         Behavior: Behavior normal.         Cognition and Memory: Cognition normal.         Procedures:              Results Review:     I reviewed the patient's new clinical results.      COVID-19 LAB PANEL     COVID-19 test result  COVID19   Date Value Ref Range Status   11/24/2020  Detected (C) Not Detected - Ref. Range Final       COVID-19 Prognostic lab results  WBC with differential  Results from last 7 days   Lab Units 12/01/20 0358 11/30/20 0301 11/29/20 0427 11/28/20 0259 11/27/20 0405 11/26/20 0323 11/25/20  0010   WBC 10*3/mm3 10.60 10.90* 8.70 7.00 9.50 7.80 4.40   PLATELETS 10*3/mm3 532* 516* 474* 487* 455* 383 341   NEUTROPHIL % %  --   --   --   --  90.1* 89.6* 91.0*   LYMPHOCYTE % %  --   --   --   --  4.0* 3.3* 4.1*   NEUTROS ABS 10*3/mm3 9.12* 8.39* 7.31* 6.23 8.60* 7.00 4.00   LYMPHS ABS 10*3/mm3 0.42* 0.33* 0.78 0.28*  --   --   --      Inflammatory markers  Results from last 7 days   Lab Units 12/01/20 0358 11/30/20 0301 11/29/20 0427 11/28/20 0259 11/27/20  0405 11/26/20  0323 11/25/20  0010   CRP mg/dL 2.08* 2.22* 2.24* 3.15* 4.68* 8.99* 29.66*   FERRITIN ng/mL 1,130.00* 959.00* 950.00* 950.00* 1,176.00* 1,301.00* 1,124.00*   CK TOTAL U/L 40 76 115 142 164 82 95   D DIMER QUANT mg/L (FEU) 1.42*  --  1.76*  --  1.98*  --  3.99*   PROCALCITONIN ng/mL 0.08  --  0.10  --  0.11  --  0.31*   LDH U/L 339*  --  369*  --  448*  --  485*   ALK PHOS U/L 77 76 69 83 85 68 68   AST (SGOT) U/L 20 29 41* 50* 27 23 26   ALT (SGPT) U/L 50* 61* 64* 47* 22 17 15       Poor COVID-19 outcomes associated with:  Neutrophil:Lymphocyte ratio >3.5  Thrombocytopenia  LFT's >5x upper limit of normal  LDH >400    Lab Results (last 24 hours)     Procedure Component Value Units Date/Time    CBC & Differential [[id number]]  (Abnormal) Collected: 12/01/20 0358    Specimen: Blood Updated: 12/01/20 0655    Narrative:      The following orders were created for panel order CBC & Differential.  Procedure                               Abnormality         Status                     ---------                               -----------         ------                     Scan Slide[[id number]]                                       Final result               CBC Auto Differential[[id number]]        Abnormal      "       Final result                 Please view results for these tests on the individual orders.    Scan Slide [[id number]] Collected: 12/01/20 0358    Specimen: Blood Updated: 12/01/20 0655     Scan Slide --     Comment: See Manual Differential Results       Manual Differential [[id number]]  (Abnormal) Collected: 12/01/20 0358    Specimen: Blood Updated: 12/01/20 0655     Neutrophil % 80.0 %      Lymphocyte % 4.0 %      Monocyte % 2.0 %      Eosinophil % 3.0 %      Bands %  6.0 %      Metamyelocyte % 3.0 %      Atypical Lymphocyte % 2.0 %      Neutrophils Absolute 9.12 10*3/mm3      Lymphocytes Absolute 0.42 10*3/mm3      Monocytes Absolute 0.21 10*3/mm3      Eosinophils Absolute 0.32 10*3/mm3      Jessup Cells Slight/1+     WBC Morphology Normal     Platelet Estimate Increased    CBC Auto Differential [[id number]]  (Abnormal) Collected: 12/01/20 0358    Specimen: Blood Updated: 12/01/20 0655     WBC 10.60 10*3/mm3      RBC 5.39 10*6/mm3      Hemoglobin 16.1 g/dL      Hematocrit 47.9 %      MCV 88.9 fL      MCH 29.8 pg      MCHC 33.5 g/dL      RDW 14.0 %      RDW-SD 42.9 fl      MPV 6.5 fL      Platelets 532 10*3/mm3     Procalcitonin [[id number]]  (Normal) Collected: 12/01/20 0358    Specimen: Blood Updated: 12/01/20 0617     Procalcitonin 0.08 ng/mL     Narrative:      As a Marker for Sepsis (Non-Neonates):   1. <0.5 ng/mL represents a low risk of severe sepsis and/or septic shock.  1. >2 ng/mL represents a high risk of severe sepsis and/or septic shock.    As a Marker for Lower Respiratory Tract Infections that require antibiotic therapy:  PCT on Admission     Antibiotic Therapy             6-12 Hrs later  > 0.5                Strongly Recommended            >0.25 - <0.5         Recommended  0.1 - 0.25           Discouraged                   Remeasure/reassess PCT  <0.1                 Strongly Discouraged          Remeasure/reassess PCT      As 28 day mortality risk marker: \"Change in Procalcitonin Result\" (> 80 % " or <=80 %) if Day 0 (or Day 1) and Day 4 values are available. Refer to http://[email or web address]-pct-calculator.[email or web address]/   Change in PCT <=80 %   A decrease of PCT levels below or equal to 80 % defines a positive change in PCT test result representing a higher risk for 28-day all-cause mortality of patients diagnosed with severe sepsis or septic shock.  Change in PCT > 80 %   A decrease of PCT levels of more than 80 % defines a negative change in PCT result representing a lower risk for 28-day all-cause mortality of patients diagnosed with severe sepsis or septic shock.                Results may be falsely decreased if patient taking Biotin.     Comprehensive Metabolic Panel [[id number]]  (Abnormal) Collected: 12/01/20 0358    Specimen: Blood Updated: 12/01/20 0551     Glucose 94 mg/dL      BUN 20 mg/dL      Creatinine 0.62 mg/dL      Sodium 133 mmol/L      Potassium 3.9 mmol/L      Chloride 100 mmol/L      CO2 21.0 mmol/L      Calcium 8.7 mg/dL      Total Protein 6.5 g/dL      Albumin 3.60 g/dL      ALT (SGPT) 50 U/L      AST (SGOT) 20 U/L      Alkaline Phosphatase 77 U/L      Total Bilirubin 0.6 mg/dL      eGFR Non African Amer 130 mL/min/1.73      Globulin 2.9 gm/dL      A/G Ratio 1.2 g/dL      BUN/Creatinine Ratio 32.3     Anion Gap 12.0 mmol/L     Narrative:      GFR Normal >60  Chronic Kidney Disease <60  Kidney Failure <15      CK [[id number]]  (Normal) Collected: 12/01/20 0358    Specimen: Blood Updated: 12/01/20 0551     Creatine Kinase 40 U/L     Lactate Dehydrogenase [[id number]]  (Abnormal) Collected: 12/01/20 0358    Specimen: Blood Updated: 12/01/20 0551      U/L     Bilirubin, Direct [[id number]]  (Normal) Collected: 12/01/20 0358    Specimen: Blood Updated: 12/01/20 0551     Bilirubin, Direct 0.2 mg/dL     C-reactive Protein [[id number]]  (Abnormal) Collected: 12/01/20 0358    Specimen: Blood Updated: 12/01/20 0551     C-Reactive Protein 2.08 mg/dL     Ferritin [[id number]]  (Abnormal) Collected: 12/01/20  0358    Specimen: Blood Updated: 12/01/20 0542     Ferritin 1,130.00 ng/mL     Narrative:      Results may be falsely decreased if patient taking Biotin.      Fibrinogen [[id number]]  (Abnormal) Collected: 12/01/20 0358    Specimen: Blood Updated: 12/01/20 0507     Fibrinogen 553 mg/dL     D-dimer, Quantitative [[id number]]  (Abnormal) Collected: 12/01/20 0358    Specimen: Blood Updated: 12/01/20 0507     D-Dimer, Quantitative 1.42 mg/L (FEU)     Narrative:      Reference Range  --------------------------------------------------------------------     < 0.50   Negative Predictive Value  0.50-0.59   Indeterminate    >= 0.60   Probable VTE             A very low percentage of patients with DVT may yield D-Dimer results   below the cut-off of 0.50 mg/L FEU.  This is known to be more   prevalent in patients with distal DVT.             Results of this test should always be interpreted in conjunction with   the patient's medical history, clinical presentation and other   findings.  Clinical diagnosis should not be based on the result of   INNOVANCE D-Dimer alone.        No results found for: HGBA1C        Results from last 7 days   Lab Units 11/25/20  0738   PH, ARTERIAL pH units 7.445   PO2 ART mm Hg 71.4*   PCO2, ARTERIAL mm Hg 30.8*   HCO3 ART mmol/L 21.2     No results found for: LIPASE  No results found for: CHOL, CHLPL, TRIG, HDL, LDL, LDLDIRECT    No results found for: INTRAOP, PREDX, FINALDX, COMDX    Microbiology Results (last 10 days)     Procedure Component Value - Date/Time    Legionella Antigen, Urine - Urine, Urine, Clean Catch [[id number]]  (Normal) Collected: 11/25/20 1442    Lab Status: Final result Specimen: Urine, Clean Catch Updated: 11/25/20 1737     LEGIONELLA ANTIGEN, URINE Negative    S. Pneumo Ag Urine or CSF - Urine, Urine, Clean Catch [[id number]]  (Normal) Collected: 11/25/20 1442    Lab Status: Final result Specimen: Urine, Clean Catch Updated: 11/25/20 9669     Strep Pneumo Ag Negative    MRSA  Screen, PCR (Inpatient) - Swab, Nares [[id number]]  (Normal) Collected: 11/25/20 0833    Lab Status: Final result Specimen: Swab from Nares Updated: 11/25/20 1026     MRSA PCR No MRSA Detected    Respiratory Panel PCR w/COVID-19(SARS-CoV-2) DANIEL/TERI/MIRELA/PAD/COR/MAD/ALIX In-House, NP Swab in UTM/VTM, 3-4 HR TAT - Swab, Nasopharynx [[id number]]  (Abnormal) Collected: 11/24/20 2131    Lab Status: Final result Specimen: Swab from Nasopharynx Updated: 11/25/20 0257     ADENOVIRUS, PCR Not Detected     Coronavirus 229E Not Detected     Coronavirus HKU1 Not Detected     Coronavirus NL63 Not Detected     Coronavirus OC43 Not Detected     COVID19 Detected     Human Metapneumovirus Not Detected     Human Rhinovirus/Enterovirus Not Detected     Influenza A PCR Not Detected     Influenza A H1 Not Detected     Influenza A H1 2009 PCR Not Detected     Influenza A H3 Not Detected     Influenza B PCR Not Detected     Parainfluenza Virus 1 Not Detected     Parainfluenza Virus 2 Not Detected     Parainfluenza Virus 3 Not Detected     Parainfluenza Virus 4 Not Detected     RSV, PCR Not Detected     Bordetella pertussis pcr Not Detected     Bordetella parapertussis PCR Not Detected     Chlamydophila pneumoniae PCR Not Detected     Mycoplasma pneumo by PCR Not Detected    Narrative:      Fact sheet for providers: https://docs.[employer]/wp-content/uploads/AYW7755-6135-TR2.1-EUA-Provider-Fact-Sheet-3.pdf    Fact sheet for patients: https://docs.[employer]/wp-content/uploads/KEC1131-6913-DM8.1-EUA-Patient-Fact-Sheet-1.pdf    Blood Culture - Blood, Hand, Left [[id number]] Collected: 11/24/20 2059    Lab Status: Final result Specimen: Blood from Hand, Left Updated: 11/29/20 2116     Blood Culture No growth at 5 days    Blood Culture - Blood, Arm, Right [[id number]] Collected: 11/24/20 2059    Lab Status: Final result Specimen: Blood from Arm, Right Updated: 11/29/20 2116     Blood Culture No growth at 5 days          ECG/EMG Results (most  recent)     None               Results for orders placed in visit on 12/16/19   SCANNED - ECHOCARDIOGRAM       Xr Chest 1 View    Result Date: 11/25/2020  Diffuse predominantly peripherally located alveolar and interstitial opacities consistent with pneumonia.  Electronically Signed By-Basilio Pizarro MD On:11/25/2020 7:35 AM This report was finalized on [id number] by  Basilio Pizarro MD.          Xrays, labs reviewed personally by physician.    Medication Review:   I have reviewed the patient's current medication list      Scheduled Meds  Acetylcysteine, 600 mg, Oral, BID  albuterol sulfate HFA, 2 puff, Inhalation, 4x Daily - RT  amLODIPine, 5 mg, Oral, Daily  amoxicillin-clavulanate, 1 tablet, Oral, Q12H  aspirin, 81 mg, Oral, Daily  colchicine, 0.6 mg, Oral, Daily  [START ON 12/2/2020] dexamethasone, 4 mg, Oral, Daily With Breakfast  enoxaparin, 40 mg, Subcutaneous, Q12H  furosemide, 20 mg, Oral, Daily  melatonin, 5 mg, Oral, Nightly  polyethylene glycol, 17 g, Oral, Daily  senna-docusate sodium, 2 tablet, Oral, Once  sodium chloride, 10 mL, Intravenous, Q12H  theophylline, 300 mg, Oral, Q24H  vitamin C, 500 mg, Oral, Q6H  vitamin D3, 5,000 Units, Oral, Daily  zinc sulfate, 220 mg, Oral, Daily        Meds Infusions  Pharmacy to Dose enoxaparin (LOVENOX),         Meds PRN  •  acetaminophen **OR** acetaminophen **OR** acetaminophen  •  ALPRAZolam  •  cyclobenzaprine  •  influenza vaccine  •  ondansetron **OR** ondansetron  •  Pharmacy to Dose enoxaparin (LOVENOX)  •  sodium chloride        Assessment/Plan   Assessment/Plan     Active Hospital Problems    Diagnosis  POA   • **Pneumonia due to COVID-19 virus [U07.1, J12.89]  Yes   • COPD with acute exacerbation (CMS/HCC) [J44.1]  Yes   • Acute hypoxemic respiratory failure (CMS/HCC) [J96.01]  Yes   • Essential hypertension [I10]  Yes      Resolved Hospital Problems   No resolved problems to display.       MEDICAL DECISION MAKING COMPLEXITY BY PROBLEM:     COVID-19  infection  -- 11/24/20 -- COVID-19 test positive  -- supplemental O2 to keep sats >93% ; currently on 6 L high flow  -- allow permissive hypoxia to sats >86%  -- pronate patient as tolerated for better oxygenation  -- anti-inflammatory supplements/treatments (per [facility] COVID-19 protocol)     -- Vitamin C 500mg PO q6h     -- Vitamin D3 5000 IU daily     -- Zinc gluconate 100 mg Daily (zinc sulfate 440 mg)     -- Melatonin 10mg nightly     -- B complex vitamins (B12, B6)     -- Famotidine 40mg/day     -- Enoxaparin as per pharmacy protocol     -- Steroids: Decadron 6 mg daily as per pulmonology     -- Remdesivir: Given 11/24/2020 through 11/28/2020 (5 days total)     -- Ivermectin not given  -- Current antibiotics: Augmentin 875 mg  -- Continue to monitor daily labs     -- CMP, CBC, CRP, Ferritin, PCT, D-dimer  -- continue supportive care as needed: antipyretics, breathing treatments, mucolytics, etc.  -- little improvement in inflammatory markers, may need to consider additional 5 days of remdesivir    Acute hypoxemic respiratory failure secondary to COVID-19 pneumonia and COPD exacerbation:  -s/p CTA chest 11/24/20--> PE ruled out  -Continue abx, Decadron, remdesivir, bronchodilators  -Check ABG 11/15/2020  -MRSA screen negative  -Respiratory culture, Legionella and Streptococcus pneumonia urine antigen  -Trend inflammatory markers  -Pulmonology following    Hypertension:  -Continue Norvasc     Erectile dysfunction:  -Hold tadalafil     Chronic pain:   -Hold meloxicam     Patient high risk for decompensation      VTE Prophylaxis -   Mechanical Order History:     None      Pharmalogical Order History:      Ordered     Dose Route Frequency Stop    11/25/20 0511  enoxaparin (LOVENOX) syringe 40 mg      40 mg SC Every 12 Hours --    11/24/20 2205  enoxaparin (LOVENOX) syringe 40 mg     Note to Pharmacy: PLTs = 313 from [place]    40 mg SC Once 11/24/20 2320 11/24/20 2024  Pharmacy to Dose enoxaparin (LOVENOX)      Question:  Indication of use  Answer:  Prophylaxis    -- XX Continuous PRN --                  Code Status -   Code Status and Medical Interventions:   Ordered at: 11/24/20 2024     Code Status:    CPR     Medical Interventions (Level of Support Prior to Arrest):    Full       This patient has been examined wearing appropriate Personal Protective Equipment. 12/01/20        Discharge Planning    Severe hypoxemia slowly improving but patient asks to go home daily.  Oriented to time place and situation x3.  Xanax ordered for anxiety.    Will likely return home at discharge.    Electronically signed by Halley Millan MD, 12/01/20, 21:35 EST.  [facility] Hospitalist Team

## 2020-12-02 NOTE — PLAN OF CARE
Goal Outcome Evaluation:  Plan of Care Reviewed With: patient  Progress: improving   Pt is down to 2L NC, no signs of distress and being discharged today.

## 2020-12-02 NOTE — DISCHARGE PLACEMENT REQUEST
"Braden Mason (65 y.o. Male)     Date of Birth Social Security Number Address Home Phone MRN    1955  [address] [place] [address] [phone] [id number]    [employer] Marital Status          None        Admission Date Admission Type Admitting Provider Attending Provider Department, Room/Bed    20 Urgent Juan Carlos Zhang DO Kapadia, Shefali A, MD [facility] 2A PEDIATRICS,     Discharge Date Discharge Disposition Discharge Destination                       Attending Provider: Halley Millan MD    Allergies: No Known Allergies    Isolation: Enh Drop/Con   Infection: COVID (confirmed) (20)   Code Status: CPR    Ht: 172.7 cm (68\")   Wt: 76.3 kg (168 lb 3.4 oz)    Admission Cmt: None   Principal Problem: Pneumonia due to COVID-19 virus [U07.1,J12.89]                 Active Insurance as of 2020     Primary Coverage     Payor Plan Insurance Group Employer/Plan Group    [facility] INSURANCE SERVICES [facility] [id number]     Payor Plan Address Payor Plan Phone Number Payor Plan Fax Number Effective Dates    [address]   2020 - None Entered    [place] [id number]       Subscriber Name Subscriber Birth Date Member ID       BRADEN MASON 1955 [id number]                 Emergency Contacts      (Rel.) Home Phone Work Phone Mobile Phone    VERA MASON (Spouse) [phone] -- [phone]    TIMO BOWSER (Other) -- -- [phone]               History & Physical      Serina Buckley FNP at 20     Attestation signed by Juan Carlos Zhang DO at 20 1820    I have read the H&P by JIMENA Coronado.  Electronically signed by Juan Carlos Zhang DO, 20, 2:54 PM EST.             Summary: Hypoxia from COVID 19 pneumonia               [facility] Medicine Services      Patient Name: Braden Mason  : 1955  MRN: [id number]  Primary Care Physician: Jose Lambert MD  Date of admission: " 11/24/2020    Patient Care Team:  Jose Lambert MD as PCP - General (Family Medicine)          Subjective   History Present Illness     Chief Complaint: Covid 19 pneumonia with hypoxia    Mr. Javier is a 65 y.o.  presents to [facility] complaining of COVID-19 with hypoxia and shortness of breath.         65-year-old male presents as transfer from [facility] in [place] where he presented with acute shortness of breath which has been worsening over the last 2 days.  The patient had onset of Covid symptoms 8 days ago which was primarily reported as a temperature of 105 °F and had a positive COVID-19 test at that time.  He was maintaining at home until the last few days when he started to experience severe shortness of breath.  The patient denies chest pain and has had minimal cough.  The patient is a former smoker quitting 8 years ago but has a significant pack history smoking 2 packs a day times approximately 45 years.  He does not have home inhalers or controller medications at home.    Laboratory results from [facility] dated 11/24/2020 ABG show pH 7.59, PCO2 22, PO2 46, bicarb 21, base deficit 1, oxygen saturation 90%; lactic acid 1.4; BNP 27; sodium 130, potassium 3.6, chloride 99, CO2 24, glucose 118, BUN 12, creatinine 0.84, ALT 22, AST 47, alkaline phosphate 83, bilirubin total 1.0, total protein 7.1, albumin 3.6, calcium 8.4, , troponin I less than 0.02, D-dimer elevated at 3.47, INR 1.28, PTT 29.6, WBC 7.8, hemoglobin 15.4, platelets 313, neutrophils 92, lymphocytes 4, absolute neutrophils 7.18, absolute lymphocytes 0.31 with a ratio of 26.      Chest x-ray from [facility] in [place] dated 11/24/2020 with comparison film 11/16/2020 impression: #1 mild cardiac megaly.  Pulmonary venous hypertension.  #2 diffuse lung infiltrate or edema right greater than left.  This represents a significant progression since the previous study.  I do not see  any effusions.  At the prior facility the patient received a 500 mg IV azithromycin; dexamethasone 8 mg IV; 82 mg Lovenox; IV contrast; 2 L normal saline    EKG from our facility dated 11/24/2020 showing sinus tachycardia, rate 104, rare PAC; CT PE protocol negative for pulmonary embolism      Review of Systems   Constitution: Positive for chills and fever.   Respiratory: Positive for cough and shortness of breath. Negative for sputum production.    All other systems reviewed and are negative.          Personal History     Past Medical History:   Past Medical History:   Diagnosis Date   • Arthritis    • Cancer (CMS/HCC)     prostate 2012   • DJD (degenerative joint disease)     neck   • Hyperlipidemia    • Hypertension    • Stroke (CMS/HCC)     tia 2019       Surgical History:    History reviewed. No pertinent surgical history.        Family History: family history is not on file. Otherwise pertinent FHx was reviewed and unremarkable.     Social History:  reports that he quit smoking about 8 years ago. He does not have any smokeless tobacco history on file. He reports previous alcohol use. He reports that he does not use drugs.      Medications:  Prior to Admission medications    Medication Sig Start Date End Date Taking? Authorizing Provider   amLODIPine (NORVASC) 5 MG tablet Take 5 mg by mouth Daily.   Yes Galina Valencia MD   aspirin 81 MG EC tablet Take 81 mg by mouth Daily.   Yes Galina Valencia MD   cyclobenzaprine (FLEXERIL) 10 MG tablet Take 10 mg by mouth 3 (Three) Times a Day As Needed for Muscle Spasms.   Yes Galina Valencia MD   meloxicam (MOBIC) 15 MG tablet Take 15 mg by mouth Daily.   Yes Galina Valencia MD   ondansetron ODT (ZOFRAN-ODT) 4 MG disintegrating tablet Place 4 mg on the tongue Every 8 (Eight) Hours As Needed for Nausea or Vomiting.   Yes aGlina Valencia MD   tadalafil (ADCIRCA) 20 MG tablet tablet Take 20 mg by mouth Daily. For erectile dysfunction   Yes  Provider, MD Galina   terbinafine (lamiSIL) 250 MG tablet Take 250 mg by mouth Daily.   Yes Provider, MD Galina       Allergies:  No Known Allergies    Objective   Objective     Vital Signs  Temp:  [98.3 °F (36.8 °C)] 98.3 °F (36.8 °C)  Heart Rate:  [95-98] 95  Resp:  [21-24] 24  BP: (133)/(74) 133/74  SpO2:  [83 %-93 %] 93 %  on  Flow (L/min):  [12-30] 30;   Device (Oxygen Therapy): heated;high-flow nasal cannula;humidified  Body mass index is 27.42 kg/m².    Physical Exam  Vitals signs and nursing note reviewed.   Constitutional:       Appearance: He is ill-appearing. He is not toxic-appearing.   HENT:      Head: Normocephalic and atraumatic.      Right Ear: External ear normal.      Left Ear: External ear normal.      Nose: Nose normal. No congestion or rhinorrhea.      Mouth/Throat:      Mouth: Mucous membranes are dry.   Eyes:      General: No scleral icterus.        Right eye: No discharge.         Left eye: No discharge.      Extraocular Movements: Extraocular movements intact.      Conjunctiva/sclera: Conjunctivae normal.      Pupils: Pupils are equal, round, and reactive to light.   Neck:      Musculoskeletal: Normal range of motion and neck supple.   Cardiovascular:      Rate and Rhythm: Normal rate and regular rhythm.      Pulses: Normal pulses.      Heart sounds: Normal heart sounds.   Pulmonary:      Effort: Respiratory distress present.      Breath sounds: Wheezing present.      Comments: Rare scattered wheeze  Abdominal:      General: Bowel sounds are normal. There is no distension.      Palpations: Abdomen is soft.   Musculoskeletal: Normal range of motion.      Right lower leg: No edema.      Left lower leg: No edema.   Skin:     General: Skin is warm and dry.   Neurological:      General: No focal deficit present.      Mental Status: He is alert and oriented to person, place, and time.   Psychiatric:         Mood and Affect: Mood normal.         Behavior: Behavior normal.         Thought  Content: Thought content normal.         Judgment: Judgment normal.         Results Review:  I have personally reviewed most recent cardiac tracings, lab results and radiology images and interpretations and agree with findings.              Invalid input(s):  ALKPHOS  CrCl cannot be calculated (No successful lab value found.).  Brief Urine Lab Results     None          Microbiology Results (last 10 days)     ** No results found for the last 240 hours. **          ECG/EMG Results (most recent)     None               Results for orders placed in visit on 12/16/19   SCANNED - ECHOCARDIOGRAM       No radiology results for the last 7 days      CrCl cannot be calculated (No successful lab value found.).    Assessment/Plan   Assessment/Plan       Active Hospital Problems    Diagnosis  POA   • Hypoxia [R09.02]  Yes      Resolved Hospital Problems   No resolved problems to display.     COVID-19 bilateral pneumonia with hypoxia: Oxygen support as needed; add remdesivir; add IV Decadron; add vitamin C; add zinc; add Pepcid p.o. 20 mg twice daily; add vitamin D 5000 units daily x5 days    --History of heavy smoking until 8 years ago which time he quit smoking    --At time of interview the patient is on 100% nonrebreather with plans to move patient to precision flow    Clinical dehydration without elevated creatinine, severe:  500 normal saline bolus over 2 hours; normal saline at 100    --Patient reports decreased oral intake times multiple days secondary to decreased appetite     --Although COVID-19 patients to be On the drier side the patient has evidence of dehydration    --Monitor fluid volume status closely    Hypertension, chronic with cardiovascular prophylaxis: Continue Norvasc: Continue aspirin    Erectile dysfunction, chronic: Hold tadalafil    Chronic pain: Hold meloxicam    Onychomycosis, chronic: Hold Lamisil      VTE Prophylaxis -   Mechanical Order History:     None      Pharmalogical Order History:      Ordered      Dose Route Frequency Stop    11/24/20 2024  Pharmacy to Dose enoxaparin (LOVENOX)     Question:  Indication of use  Answer:  Prophylaxis    -- XX Continuous PRN --                CODE STATUS:    Code Status and Medical Interventions:   Ordered at: 11/24/20 2024     Code Status:    CPR     Medical Interventions (Level of Support Prior to Arrest):    Full       This patient has been examined wearing appropriate Personal Protective Equipment and patient is COVID-19 positive. 11/24/20      I discussed the patient's findings and my recommendations with patient and nursing staff.      Signature: Electronically signed by JIMENA Coronado, 11/25/20, 3:51 AM EST.      [facility] Hospitalist Team          Electronically signed by Juan Carlos Zhang, DO at 11/25/20 9725

## 2020-12-02 NOTE — DISCHARGE SUMMARY
[facility] Medicine Services  DISCHARGE SUMMARY        Prepared For PCP:  Jose Lambert MD    Patient Name: Braden Javier  : 1955  MRN: [id number]      Date of Admission:   2020    Date of Discharge:  2020    Length of stay:  LOS: 8 days     Hospital Course     Presenting Problem:   Hypoxia [R09.02]  Pneumonia due to COVID-19 virus [U07.1, J12.89]  Hypoxia [R09.02]      Active Hospital Problems    Diagnosis  POA   • **Pneumonia due to COVID-19 virus [U07.1, J12.89]  Yes   • COPD with acute exacerbation (CMS/HCC) [J44.1]  Yes   • Essential hypertension [I10]  Yes      Resolved Hospital Problems    Diagnosis Date Resolved POA   • Acute hypoxemic respiratory failure (CMS/HCC) [J96.01] 2020 Yes           Hospital Course:  Braden Javier is a 65 y.o. male with history of tobacco use that presented to [facility] ED on 20 for evaluation of about 8 days of fevers, chills, fatigue,  shortness of air and LITTLEJOHN.  T-max at home was 105F and the patient quit smoking about 8 years ago.     At [facility] ED, ABG :  pH 7.59, PCO2 22, PO2 46.  Chest x-ray showed pneumonia.  CTA of the chest ruled out pulmonary embolism.    20: Complains of shortness of air and fatigue.  Consulted Dr. Delgado.  20: Still on 100%FIO2.   20: Tolerating oral diet.  OOB to chair.  Afebrile.  Still on 100% FiO2.  20: Feels better.  Wants to go home.  Tolerating diet.  Complains of constipation.  20: Requiring 15 L.  Tolerating diet.  Explained reasons for continued hospitalization.  Patient competent to make decisions.  Ordered Xanax PRN.  2020: Wean down to 6 L high flow. Anxious to go home.  2020: Remains on 6 L high flow oxygen at this time.    The patient's acute and chronic medical problems were managed as outlined:    COVID-19 infection  -- 20 -- COVID-19 test positive  -- supplemental O2 to keep sats >93% ; currently on 6 L high  flow  -- allow permissive hypoxia to sats >86%  -- pronate patient as tolerated for better oxygenation  -- anti-inflammatory supplements/treatments (per [facility] COVID-19 protocol)     -- Vitamin C 500mg PO q6h     -- Vitamin D3 5000 IU daily     -- Zinc gluconate 100 mg Daily (zinc sulfate 440 mg)     -- Melatonin 10mg nightly     -- B complex vitamins (B12, B6)     -- Famotidine 40mg/day     -- Enoxaparin as per pharmacy protocol     -- Steroids: Decadron 6 mg daily as per pulmonology     -- Remdesivir: Given 11/24/2020 through 11/28/2020 (5 days total)     -- Ivermectin not given  -- Current antibiotics: Augmentin 875 mg  -- Continue to monitor daily labs     -- CMP, CBC, CRP, Ferritin, PCT, D-dimer  -- continue supportive care as needed: antipyretics, breathing treatments, mucolytics, etc.  -- little improvement in inflammatory markers, may need to consider additional 5 days of remdesivir     Acute hypoxemic respiratory failure secondary to COVID-19 pneumonia and COPD exacerbation:  -s/p CTA chest 11/24/20--> PE ruled out  -Continue abx, Decadron, remdesivir, bronchodilators  -Check ABG 11/15/2020  -MRSA screen negative  -Respiratory culture, Legionella and Streptococcus pneumonia urine antigen  -Trend inflammatory markers  -Pulmonology following     Hypertension:  -Continue Norvasc    The patient was discharged home with home health and supplemental oxygen. The patient was given instructions for follow-up as noted below.      Day of Discharge     HPI:  No acute events. Pt wants to go home, cleared by pulmonology to return home at this time.    Vital Signs:   Temp:  [95.7 °F (35.4 °C)-98.2 °F (36.8 °C)] 98.2 °F (36.8 °C)  Heart Rate:  [] 98  Resp:  [16-18] 17  BP: (117-127)/(79-87) 125/87     Physical Exam:  General: well-developed and well-nourished, NAD  HEENT: NC/AT, EOMI, PERRLA  Heart: RRR. No murmur   Chest: CTAB mostly, few rhonchi, no wheezing, normal respiratory effort  Abdominal: Soft. NT/ND. Bowel  sounds present  Musculoskeletal: Normal ROM.  No edema. No calf tenderness.  Neurological: AAOx3, no focal deficits  Skin: Skin is warm and dry. No rash  Psychiatric: Normal mood and affect.    Pertinent  and/or Most Recent Results     Results from last 7 days   Lab Units 12/02/20 0629 12/01/20 0358 11/30/20 0301 11/29/20 0427 11/28/20 0259 11/27/20 0405 11/26/20  0323   WBC 10*3/mm3 9.40 10.60 10.90* 8.70 7.00 9.50 7.80   HEMOGLOBIN g/dL 16.7 16.1 15.3 14.3 13.3 13.8 11.8*   HEMATOCRIT % 49.8 47.9 44.6 42.4 39.6 40.3 35.8*   PLATELETS 10*3/mm3 500* 532* 516* 474* 487* 455* 383   SODIUM mmol/L 134* 133* 134* 134* 136 137 139   POTASSIUM mmol/L 4.2 3.9 3.9 3.8 3.9 4.1 3.9   CHLORIDE mmol/L 99 100 102 104 105 106 108*   CO2 mmol/L 22.0 21.0* 22.0 21.0* 21.0* 21.0* 23.0   BUN mg/dL 22 20 16 14 13 14 11   CREATININE mg/dL 0.74* 0.62* 0.58* 0.62* 0.65* 0.63* 0.57*   GLUCOSE mg/dL 83 94 105* 89 133* 109* 146*   CALCIUM mg/dL 9.0 8.7 8.2* 8.2* 8.0* 8.0* 7.7*     Results from last 7 days   Lab Units 12/02/20 0629 12/01/20 0358 11/30/20 0301 11/29/20 0427 11/28/20 0259 11/27/20 0405 11/26/20  0323   BILIRUBIN mg/dL 0.7 0.6 0.6 0.5 0.5 0.5 0.3   ALK PHOS U/L 80 77 76 69 83 85 68   ALT (SGPT) U/L 43* 50* 61* 64* 47* 22 17   AST (SGOT) U/L 17 20 29 41* 50* 27 23           Invalid input(s): TG, LDLCALC, LDLREALC  Results from last 7 days   Lab Units 12/01/20  0358 11/29/20  0427 11/27/20  0405   PROCALCITONIN ng/mL 0.08 0.10 0.11       Brief Urine Lab Results     None          Microbiology Results Abnormal     Procedure Component Value - Date/Time    Blood Culture - Blood, Hand, Left [[id number]] Collected: 11/24/20 2059    Lab Status: Final result Specimen: Blood from Hand, Left Updated: 11/29/20 2116     Blood Culture No growth at 5 days    Blood Culture - Blood, Arm, Right [[id number]] Collected: 11/24/20 2059    Lab Status: Final result Specimen: Blood from Arm, Right Updated: 11/29/20 2116     Blood Culture No  growth at 5 days    S. Pneumo Ag Urine or CSF - Urine, Urine, Clean Catch [[id number]]  (Normal) Collected: 11/25/20 1442    Lab Status: Final result Specimen: Urine, Clean Catch Updated: 11/25/20 1738     Strep Pneumo Ag Negative    Legionella Antigen, Urine - Urine, Urine, Clean Catch [[id number]]  (Normal) Collected: 11/25/20 1442    Lab Status: Final result Specimen: Urine, Clean Catch Updated: 11/25/20 1737     LEGIONELLA ANTIGEN, URINE Negative    MRSA Screen, PCR (Inpatient) - Swab, Nares [[id number]]  (Normal) Collected: 11/25/20 0833    Lab Status: Final result Specimen: Swab from Nares Updated: 11/25/20 1026     MRSA PCR No MRSA Detected    Respiratory Panel PCR w/COVID-19(SARS-CoV-2) DANIEL/TERI/MIRELA/PAD/COR/MAD/ALIX In-House, NP Swab in UTM/VTM, 3-4 HR TAT - Swab, Nasopharynx [[id number]]  (Abnormal) Collected: 11/24/20 2131    Lab Status: Final result Specimen: Swab from Nasopharynx Updated: 11/25/20 0257     ADENOVIRUS, PCR Not Detected     Coronavirus 229E Not Detected     Coronavirus HKU1 Not Detected     Coronavirus NL63 Not Detected     Coronavirus OC43 Not Detected     COVID19 Detected     Human Metapneumovirus Not Detected     Human Rhinovirus/Enterovirus Not Detected     Influenza A PCR Not Detected     Influenza A H1 Not Detected     Influenza A H1 2009 PCR Not Detected     Influenza A H3 Not Detected     Influenza B PCR Not Detected     Parainfluenza Virus 1 Not Detected     Parainfluenza Virus 2 Not Detected     Parainfluenza Virus 3 Not Detected     Parainfluenza Virus 4 Not Detected     RSV, PCR Not Detected     Bordetella pertussis pcr Not Detected     Bordetella parapertussis PCR Not Detected     Chlamydophila pneumoniae PCR Not Detected     Mycoplasma pneumo by PCR Not Detected    Narrative:      Fact sheet for providers: https://docs.[employer]/wp-content/uploads/GPW9740-1894-TM2.1-EUA-Provider-Fact-Sheet-3.pdf    Fact sheet for patients:  https://docs.[employer]/wp-content/uploads/LWY9930-6351-CV7.1-EUA-Patient-Fact-Sheet-1.pdf          Xr Chest 1 View    Result Date: 11/25/2020  Impression: Diffuse predominantly peripherally located alveolar and interstitial opacities consistent with pneumonia.  Electronically Signed By-Basilio Pizarro MD On:11/25/2020 7:35 AM This report was finalized on [id number] by  Basilio Pizarro MD.      Results for orders placed in visit on 12/16/19   SCANNED - ECHOCARDIOGRAM       Test Results Pending at Discharge      Procedures Performed      Consults:   Consults     Date and Time Order Name Status Description    11/25/2020 0623 Inpatient Pulmonology Consult Completed           Discharge Details        Discharge Medications      New Medications      Instructions Start Date   amoxicillin-clavulanate 875-125 MG per tablet  Commonly known as: AUGMENTIN   1 tablet, Oral, Every 12 Hours Scheduled      ascorbic acid 1000 MG tablet  Commonly known as: VITAMIN C   1,000 mg, Oral, 2 times daily      famotidine 20 MG tablet  Commonly known as: PEPCID   20 mg, Oral, 2 Times Daily Before Meals      furosemide 20 MG tablet  Commonly known as: LASIX   20 mg, Oral, Daily   Start Date: December 3, 2020     melatonin 5 MG tablet tablet   10 mg, Oral, Nightly      methylPREDNISolone 4 MG dose pack  Commonly known as: MEDROL   Take as directed on package instructions.      polyethylene glycol 17 GM/SCOOP powder  Commonly known as: MIRALAX   17 g, Oral, Daily      pyridoxine 100 MG tablet  Commonly known as: VITAMIN B-6   100 mg, Oral, Daily   Start Date: December 3, 2020     theophylline 300 MG 24 hr capsule  Commonly known as: MCKYA-24   300 mg, Oral, Every 24 Hours Scheduled   Start Date: December 3, 2020     thiamine 100 MG tablet  Commonly known as: VITAMIN B-1   100 mg, Oral, Daily   Start Date: December 3, 2020     vitamin b complex capsule capsule   1 capsule, Oral, 2 times daily      vitamin D3 125 MCG (5000 UT) capsule capsule    5,000 Units, Oral, Daily   Start Date: December 3, 2020     zinc sulfate 220 (50 Zn) MG capsule  Commonly known as: ZINCATE   220 mg, Oral, Daily   Start Date: December 3, 2020        Continue These Medications      Instructions Start Date   amLODIPine 5 MG tablet  Commonly known as: NORVASC   5 mg, Oral, Daily      aspirin 81 MG EC tablet   81 mg, Oral, Daily      cyclobenzaprine 10 MG tablet  Commonly known as: FLEXERIL   10 mg, Oral, 3 Times Daily PRN      meloxicam 15 MG tablet  Commonly known as: MOBIC   15 mg, Oral, Daily      ondansetron ODT 4 MG disintegrating tablet  Commonly known as: ZOFRAN-ODT   4 mg, Translingual, Every 8 Hours PRN      tadalafil 20 MG tablet tablet  Commonly known as: ADCIRCA   20 mg, Oral, Every 24 Hours Scheduled, For erectile dysfunction       terbinafine 250 MG tablet  Commonly known as: lamiSIL   250 mg, Oral, Daily             No Known Allergies    Dis[place] [facility] [place]:  Hospital:  Diet Order   Procedures   • Diet Regular       Discharge Activity:   Activity Instructions     Activity as Tolerated            CODE STATUS:    Code Status and Medical Interventions:   Ordered at: 11/24/20 2024     Code Status:    CPR     Medical Interventions (Level of Support Prior to Arrest):    Full         Follow-up Appointments  No future appointments.    Additional Instructions for the Follow-ups that You Need to Schedule     Ambulatory Referral to Home Health   As directed      Face to Face Visit Date: 12/1/2020    Follow-up provider for Plan of Care?: I treated the patient in an acute care facility and will not continue treatment after discharge.    Follow-up provider: STEVAN SONG [[id number]]    Reason/Clinical Findings: Pneumonia - COVID 19    Describe mobility limitations that make leaving home difficult: weakness, new O2    Nursing/Therapeutic Services Requested: Skilled Nursing (HHC to eval and treat)    Skilled nursing orders: Other (HHC to eval and  treat)    Frequency: 1 Week 1         Call MD With Problems / Concerns   As directed      Instructions: Call [phone] or email [email or web address] for problems or concerns.    Order Comments: Instructions: Call [phone] or email [email or web address] for problems or concerns.          Discharge Follow-up with PCP   As directed       Currently Documented PCP:    Jose Lambert MD    PCP Phone Number:    [phone]     Follow Up Details: 2 weeks, call for telehealth visit if need to see sooner         Discharge Follow-up with Specified Provider: Pulmonology - Dr. Delgado; 2 Weeks   As directed      To: Pulmonology Tate Delgado    Follow Up: 2 Weeks                 Condition on Discharge:      Stable      This patient has been examined wearing appropriate Personal Protective Equipment. 12/02/20      Electronically signed by Halley Millan MD, 12/02/20, 4:32 PM EST.      Time: I spent  45  minutes on this discharge activity which included face-to-face encounter with the patient/reviewing the data in the system/coordination of the care with the nursing staff as well as consultants/documentation/entering orders.

## 2020-12-02 NOTE — SIGNIFICANT NOTE
"Phone visit     PT encouraged that he is able to be discharged today. PT believes that due to recent circumstances he wants to \"follow in the steps of the Lord\". PT says that he wants to make most of his life and that he wants to seek out support from local Scientologist and help others. PT voiced thankfulness throughout visit and hopes to share God's glory with others. Prayed with PT        12/02/20 1340   Coping/Psychosocial   Verbalized Emotional State acceptance   Trust Relationship/Rapport thoughts/feelings acknowledged   Additional Documentation Spiritual Care (Group)   Spiritual Care   Spiritual Care Visit Type follow-up   Spiritual Care Source patient request (describe)   Receptivity to Spiritual Care visit welcomed  (Phone visit PT COVID +)   Spiritual Care Request spiritual/moral support;prayer support   Spiritual Care Interventions supportive conversation provided;theological discussion provided;prayer support provided   Response to Spiritual Care thanks expressed;receptive of support;engaged in conversation   Use of Spiritual Resources spirituality for coping, indicated strong use of;prayer   Spiritual Care Follow-Up follow-up planned regularly for general support     "

## 2020-12-02 NOTE — PROGRESS NOTES
Exercise Oximetry    Patient Name:Braden Javier   MRN: [id number]   Date: 12/02/20             ROOM AIR BASELINE   SpO2% 86%   Heart Rate    Blood Pressure      EXERCISE ON ROOM AIR SpO2% EXERCISE ON O2 @ n/a LPM SpO2%   1 MINUTE n/a 1 MINUTE    2 MINUTES n/a 2 MINUTES    3 MINUTES n/a 3 MINUTES    4 MINUTES n/a 4 MINUTES    5 MINUTES n/a 5 MINUTES    6 MINUTES n/a 6 MINUTES               Distance Walked  n/a Distance Walked   Dyspnea (Ct Scale)  n/a Dyspnea (Ct Scale)   Fatigue (Ct Scale)  n/a Fatigue (Ct Scale)   SpO2% Post Exercise  n/a SpO2% Post Exercise   HR Post Exercise  n/a HR Post Exercise   Time to Recovery  n/a Time to Recovery     Comments: pt was on a 2lpm high-flow nasal cannula prior to exercise oximetry. Removed O2 and left pt on room air for a few minutes. Sats dropped and room air baseline while laying in bed was 86%. Discontinued walk and placed regular 2lpm nasal cannula on pt and sats increased and maintained @91%. Pt did not appear short of breath or in any respiratory distress. Pt does not currently use O2 @home

## 2020-12-02 NOTE — PLAN OF CARE
Goal Outcome Evaluation:  Plan of Care Reviewed With: patient  Progress: improving   No respiratory distress noted; has slept well tonight; remains on 6LHF tonight; no complaints; will continue to monitor patient.

## 2020-12-03 ENCOUNTER — READMISSION MANAGEMENT (OUTPATIENT)
Dept: CALL CENTER | Facility: HOSPITAL | Age: 65
End: 2020-12-03

## 2020-12-03 NOTE — PROGRESS NOTES
Case Management Discharge Note      Final Note: Pending home health referral follow up. [facility] [place] [facility] declined.                          Final Discharge Disposition Code: 01 - home or self-care

## 2020-12-03 NOTE — OUTREACH NOTE
Prep Survey      Responses   [facility]?  [facility]   Is LACE score < 7 ?  No   Eligibility  Readm Mgmt   Discharge diagnosis  Pneumonia due to COVID-19 virus   Does the patient have one of the following disease processes/diagnoses(primary or secondary)?  COVID-19   Does the patient have Home health ordered?  No   Is there a DME ordered?  Yes   What DME was ordered?  O2 per [facility]   Prep survey completed?  Yes          Dayanna Mccartney RN

## 2020-12-03 NOTE — OUTREACH NOTE
COVID-19 Week 1 Survey      Responses   [facility] patient discharged from?  [facility]   Does the patient have one of the following disease processes/diagnoses(primary or secondary)?  COVID-19   COVID-19 underlying condition?  COPD   Call Number  Call 1   Week 1 Call successful?  No   Discharge diagnosis  Pneumonia due to COVID-19 virus          Martina Goel LPN

## 2020-12-03 NOTE — DISCHARGE PLACEMENT REQUEST
"Braden Mason (65 y.o. Male)     Date of Birth Social Security Number Address Home Phone MRN    1955  [address] [place] [address] [phone] [id number]    Jew Marital Status          None        Admission Date Admission Type Admitting Provider Attending Provider Department, Room/Bed    20 Urgent Juan Carlos Zhang DO  [facility] 2A PEDIATRICS, 204    Discharge Date Discharge Disposition Discharge Destination        2020 Home-Health Care [facility]              Attending Provider: (none)   Allergies: No Known Allergies    Isolation: Enh Drop/Con   Infection: COVID (confirmed) (20)   Code Status: Prior    Ht: 172.7 cm (68\")   Wt: 76.3 kg (168 lb 3.4 oz)    Admission Cmt: None   Principal Problem: Pneumonia due to COVID-19 virus [U07.1,J12.89]                 Active Insurance as of 2020     Primary Coverage     Payor Plan Insurance Group Employer/Plan Group    [facility] INSURANCE SERVICES [facility] [id number]     Payor Plan Address Payor Plan Phone Number Payor Plan Fax Number Effective Dates    [address]   2020 - None Entered    [place] [address]       Subscriber Name Subscriber Birth Date Member ID       BRADEN MASON 1955 [id number]                 Emergency Contacts      (Rel.) Home Phone Work Phone Mobile Phone    VERA MASON (Spouse) [phone] -- [phone]    TIMO BOWSER (Other) -- -- [phone]               History & Physical      Serina Buckley FNP at 20     Attestation signed by Juan Carlos Zhang DO at 20 [phone]    I have read the H&P by JIMENA Coronado.  Electronically signed by Juan Carlos Zhang DO, 20, 2:54 PM EST.             Summary: Hypoxia from COVID 19 pneumonia               [facility] Medicine Services      Patient Name: Braden Mason  : 1955  MRN: [id number]  Primary Care Physician: Jose Lambert MD  Date of admission: " 11/24/2020    Patient Care Team:  Jose Lambert MD as PCP - General (Family Medicine)          Subjective   History Present Illness     Chief Complaint: Covid 19 pneumonia with hypoxia    Mr. Javier is a 65 y.o.  presents to [facility] complaining of COVID-19 with hypoxia and shortness of breath.         65-year-old male presents as transfer from [facility] in [place] where he presented with acute shortness of breath which has been worsening over the last 2 days.  The patient had onset of Covid symptoms 8 days ago which was primarily reported as a temperature of 105 °F and had a positive COVID-19 test at that time.  He was maintaining at home until the last few days when he started to experience severe shortness of breath.  The patient denies chest pain and has had minimal cough.  The patient is a former smoker quitting 8 years ago but has a significant pack history smoking 2 packs a day times approximately 45 years.  He does not have home inhalers or controller medications at home.    Laboratory results from [facility] dated 11/24/2020 ABG show pH 7.59, PCO2 22, PO2 46, bicarb 21, base deficit 1, oxygen saturation 90%; lactic acid 1.4; BNP 27; sodium 130, potassium 3.6, chloride 99, CO2 24, glucose 118, BUN 12, creatinine 0.84, ALT 22, AST 47, alkaline phosphate 83, bilirubin total 1.0, total protein 7.1, albumin 3.6, calcium 8.4, , troponin I less than 0.02, D-dimer elevated at 3.47, INR 1.28, PTT 29.6, WBC 7.8, hemoglobin 15.4, platelets 313, neutrophils 92, lymphocytes 4, absolute neutrophils 7.18, absolute lymphocytes 0.31 with a ratio of 26.      Chest x-ray from [facility] in [place] dated 11/24/2020 with comparison film 11/16/2020 impression: #1 mild cardiac megaly.  Pulmonary venous hypertension.  #2 diffuse lung infiltrate or edema right greater than left.  This represents a significant progression since the previous study.  I do not see  any effusions.  At the prior facility the patient received a 500 mg IV azithromycin; dexamethasone 8 mg IV; 82 mg Lovenox; IV contrast; 2 L normal saline    EKG from our facility dated 11/24/2020 showing sinus tachycardia, rate 104, rare PAC; CT PE protocol negative for pulmonary embolism      Review of Systems   Constitution: Positive for chills and fever.   Respiratory: Positive for cough and shortness of breath. Negative for sputum production.    All other systems reviewed and are negative.          Personal History     Past Medical History:   Past Medical History:   Diagnosis Date   • Arthritis    • Cancer (CMS/HCC)     prostate 2012   • DJD (degenerative joint disease)     neck   • Hyperlipidemia    • Hypertension    • Stroke (CMS/HCC)     tia 2019       Surgical History:    History reviewed. No pertinent surgical history.        Family History: family history is not on file. Otherwise pertinent FHx was reviewed and unremarkable.     Social History:  reports that he quit smoking about 8 years ago. He does not have any smokeless tobacco history on file. He reports previous alcohol use. He reports that he does not use drugs.      Medications:  Prior to Admission medications    Medication Sig Start Date End Date Taking? Authorizing Provider   amLODIPine (NORVASC) 5 MG tablet Take 5 mg by mouth Daily.   Yes Galina Valencia MD   aspirin 81 MG EC tablet Take 81 mg by mouth Daily.   Yes Galina Valencia MD   cyclobenzaprine (FLEXERIL) 10 MG tablet Take 10 mg by mouth 3 (Three) Times a Day As Needed for Muscle Spasms.   Yes Galina Valencia MD   meloxicam (MOBIC) 15 MG tablet Take 15 mg by mouth Daily.   Yes Galina Valencia MD   ondansetron ODT (ZOFRAN-ODT) 4 MG disintegrating tablet Place 4 mg on the tongue Every 8 (Eight) Hours As Needed for Nausea or Vomiting.   Yes Galina Valencia MD   tadalafil (ADCIRCA) 20 MG tablet tablet Take 20 mg by mouth Daily. For erectile dysfunction   Yes  Provider, MD Galina   terbinafine (lamiSIL) 250 MG tablet Take 250 mg by mouth Daily.   Yes Provider, MD Galina       Allergies:  No Known Allergies    Objective   Objective     Vital Signs  Temp:  [98.3 °F (36.8 °C)] 98.3 °F (36.8 °C)  Heart Rate:  [95-98] 95  Resp:  [21-24] 24  BP: (133)/(74) 133/74  SpO2:  [83 %-93 %] 93 %  on  Flow (L/min):  [12-30] 30;   Device (Oxygen Therapy): heated;high-flow nasal cannula;humidified  Body mass index is 27.42 kg/m².    Physical Exam  Vitals signs and nursing note reviewed.   Constitutional:       Appearance: He is ill-appearing. He is not toxic-appearing.   HENT:      Head: Normocephalic and atraumatic.      Right Ear: External ear normal.      Left Ear: External ear normal.      Nose: Nose normal. No congestion or rhinorrhea.      Mouth/Throat:      Mouth: Mucous membranes are dry.   Eyes:      General: No scleral icterus.        Right eye: No discharge.         Left eye: No discharge.      Extraocular Movements: Extraocular movements intact.      Conjunctiva/sclera: Conjunctivae normal.      Pupils: Pupils are equal, round, and reactive to light.   Neck:      Musculoskeletal: Normal range of motion and neck supple.   Cardiovascular:      Rate and Rhythm: Normal rate and regular rhythm.      Pulses: Normal pulses.      Heart sounds: Normal heart sounds.   Pulmonary:      Effort: Respiratory distress present.      Breath sounds: Wheezing present.      Comments: Rare scattered wheeze  Abdominal:      General: Bowel sounds are normal. There is no distension.      Palpations: Abdomen is soft.   Musculoskeletal: Normal range of motion.      Right lower leg: No edema.      Left lower leg: No edema.   Skin:     General: Skin is warm and dry.   Neurological:      General: No focal deficit present.      Mental Status: He is alert and oriented to person, place, and time.   Psychiatric:         Mood and Affect: Mood normal.         Behavior: Behavior normal.         Thought  Content: Thought content normal.         Judgment: Judgment normal.         Results Review:  I have personally reviewed most recent cardiac tracings, lab results and radiology images and interpretations and agree with findings.              Invalid input(s):  ALKPHOS  CrCl cannot be calculated (No successful lab value found.).  Brief Urine Lab Results     None          Microbiology Results (last 10 days)     ** No results found for the last 240 hours. **          ECG/EMG Results (most recent)     None               Results for orders placed in visit on 12/16/19   SCANNED - ECHOCARDIOGRAM       No radiology results for the last 7 days      CrCl cannot be calculated (No successful lab value found.).    Assessment/Plan   Assessment/Plan       Active Hospital Problems    Diagnosis  POA   • Hypoxia [R09.02]  Yes      Resolved Hospital Problems   No resolved problems to display.     COVID-19 bilateral pneumonia with hypoxia: Oxygen support as needed; add remdesivir; add IV Decadron; add vitamin C; add zinc; add Pepcid p.o. 20 mg twice daily; add vitamin D 5000 units daily x5 days    --History of heavy smoking until 8 years ago which time he quit smoking    --At time of interview the patient is on 100% nonrebreather with plans to move patient to precision flow    Clinical dehydration without elevated creatinine, severe:  500 normal saline bolus over 2 hours; normal saline at 100    --Patient reports decreased oral intake times multiple days secondary to decreased appetite     --Although COVID-19 patients to be On the drier side the patient has evidence of dehydration    --Monitor fluid volume status closely    Hypertension, chronic with cardiovascular prophylaxis: Continue Norvasc: Continue aspirin    Erectile dysfunction, chronic: Hold tadalafil    Chronic pain: Hold meloxicam    Onychomycosis, chronic: Hold Lamisil      VTE Prophylaxis -   Mechanical Order History:     None      Pharmalogical Order History:      Ordered      Dose Route Frequency Stop    11/24/20 2024  Pharmacy to Dose enoxaparin (LOVENOX)     Question:  Indication of use  Answer:  Prophylaxis    -- XX Continuous PRN --                CODE STATUS:    Code Status and Medical Interventions:   Ordered at: 11/24/20 2024     Code Status:    CPR     Medical Interventions (Level of Support Prior to Arrest):    Full       This patient has been examined wearing appropriate Personal Protective Equipment and patient is COVID-19 positive. 11/24/20      I discussed the patient's findings and my recommendations with patient and nursing staff.      Signature: Electronically signed by JIMENA Coronado, 11/25/20, 3:51 AM EST.      [facility] [place], operated by [facility] Hospitalist Team          Electronically signed by Juan Carlos Zhang, DO at 11/25/20 8309         No current facility-administered medications for this encounter.      Current Outpatient Medications   Medication Sig Dispense Refill   • amLODIPine (NORVASC) 5 MG tablet Take 5 mg by mouth Daily.     • aspirin 81 MG EC tablet Take 81 mg by mouth Daily.     • cyclobenzaprine (FLEXERIL) 10 MG tablet Take 10 mg by mouth 3 (Three) Times a Day As Needed for Muscle Spasms.     • meloxicam (MOBIC) 15 MG tablet Take 15 mg by mouth Daily.     • ondansetron ODT (ZOFRAN-ODT) 4 MG disintegrating tablet Place 4 mg on the tongue Every 8 (Eight) Hours As Needed for Nausea or Vomiting.     • tadalafil (ADCIRCA) 20 MG tablet tablet Take 20 mg by mouth Daily. For erectile dysfunction     • terbinafine (lamiSIL) 250 MG tablet Take 250 mg by mouth Daily.     • amoxicillin-clavulanate (AUGMENTIN) 875-125 MG per tablet Take 1 tablet by mouth Every 12 (Twelve) Hours for 1 dose. Indications: Pneumonia 1 tablet 0   • B Complex Vitamins (vitamin b complex) capsule capsule Take 1 capsule by mouth 2 (two) times a day for 14 days. 28 capsule 0   • Cholecalciferol (vitamin D3) 125 MCG (5000 UT) capsule capsule Take 1 capsule by mouth Daily for 14 days. 14 capsule 0   •  famotidine (PEPCID) 20 MG tablet Take 1 tablet by mouth 2 (Two) Times a Day Before Meals for 14 days. 28 tablet 0   • furosemide (LASIX) 20 MG tablet Take 1 tablet by mouth Daily. 30 tablet 0   • melatonin 5 MG tablet tablet Take 2 tablets by mouth Every Night for 14 days. 28 tablet 0   • methylPREDNISolone (MEDROL) 4 MG dose pack Take as directed on package instructions. 21 tablet 0   • polyethylene glycol (MIRALAX) 17 GM/SCOOP powder Take 17 g by mouth Daily. 507 g 0   • theophylline (MCKAY-24) 300 MG 24 hr capsule Take 1 capsule by mouth Daily. 30 capsule 0   • thiamine (VITAMIN B-1) 100 MG tablet Take 1 tablet by mouth Daily for 14 days. 14 tablet 0   • vitamin B-6 (VITAMIN B-6) 100 MG tablet Take 1 tablet by mouth Daily for 14 days. 14 tablet 0   • vitamin C (VITAMIN C) 1000 MG tablet Take 1 tablet by mouth 2 (two) times a day for 14 days. 28 tablet 0   • zinc sulfate (ZINCATE) 220 (50 Zn) MG capsule Take 1 capsule by mouth Daily for 14 days. 14 capsule 0     Referral Orders (last 24 hours) (24h ago, onward)     Start     Ordered    12/03/20 0000  Ambulatory Referral to Home Health     Question Answer Comment   Face to Face Visit Date: 12/3/2020    Follow-up provider for Plan of Care? I treated the patient in an acute care facility and will not continue treatment after discharge.    Follow-up provider: STEVAN SONG    Reason/Clinical Findings Pneumonia COVID 19    Describe mobility limitations that make leaving home difficult: weakness    Nursing/Therapeutic Services Requested Skilled Nursing [facility] Eval and treat   Nursing/Therapeutic Services Requested Other    Nursing/Therapeutic Services Requested Physical Therapy    Skilled nursing orders: Other [facility] eval and treat   Frequency: 1 Week 1        12/03/20 0745    12/01/20 0000  Ambulatory Referral to Home Health     Question Answer Comment   Face to Face Visit Date: 12/1/2020    Follow-up provider for Plan of Care? I treated the patient in an acute care  facility and will not continue treatment after discharge.    Follow-up provider: STEVAN SONG    Reason/Clinical Findings Pneumonia - COVID 19    Describe mobility limitations that make leaving home difficult: weakness, new O2    Nursing/Therapeutic Services Requested Skilled Nursing HHC to eval and treat   Skilled nursing orders: Other HHC to eval and treat   Frequency: 1 Week 1        12/01/20 2495

## 2020-12-03 NOTE — PAYOR COMM NOTE
"NOTIFICATION OF DISCHARGE:      Braden Msaon (65 y.o. Male) 1955  AUTH # [id number]      DISCHARGED TO HOME WITH [facility] ON 12/02/2020.                Laurence Lee RN MSN  /UR  [facility]  [phone] office  [phone] fax  [email or web address]    [facility]  NPI: [id number]  Tax: [id number]        Braden Mason (65 y.o. Male)     Date of Birth Social Security Number Address Home Phone MRN    1955  [address]  [place] [address] [phone] [id number]    Gnosticist Marital Status          None        Admission Date Admission Type Admitting Provider Attending Provider Department, Room/Bed    11/24/20 Urgent Juan Carlos Zhang DO  [facility] 2A PEDIATRICS, 204/1    Discharge Date Discharge Disposition Discharge Destination        12/2/2020 Home-Health Care [facility] [place]              Attending Provider: (none)   Allergies: No Known Allergies    Isolation: Enh Drop/Con   Infection: COVID (confirmed) (11/25/20)   Code Status: Prior    Ht: 172.7 cm (68\")   Wt: 76.3 kg (168 lb 3.4 oz)    Admission Cmt: None   Principal Problem: Pneumonia due to COVID-19 virus [U07.1,J12.89]                 Active Insurance as of 11/24/2020     Primary Coverage     Payor Plan Insurance Group Employer/Plan Group    Health system INSURANCE SERVICES [facility] [id number]     Payor Plan Address Payor Plan Phone Number Payor Plan Fax Number Effective Dates    [address]   11/1/2020 - None Entered    [place] [address]       Subscriber Name Subscriber Birth Date Member ID       BRADEN MASON 1955 [id number]                 Emergency Contacts      (Rel.) Home Phone Work Phone Mobile Phone    VERA MASON (Spouse) [phone] -- [phone]    TIMO BOWSER (Other) -- -- [phone]            Discharge Summary    No notes of this type exist for this encounter.         Discharge Order (From admission, onward)     Start     Ordered "    12/02/20 1608  Discharge patient  Once     Expected Discharge Date: 12/02/20    Discharge Disposition: Home-Health Care [facility]    Physician of Record for Attribution - Please select from Treatment Team: VIRGEN MENDES [[id number]]    Review needed by CMO to determine Physician of Record: No       Question Answer Comment   Physician of Record for Attribution - Please select from Treatment Team VIRGEN MENDES    Review needed by CMO to determine Physician of Record No        12/02/20 1632

## 2020-12-04 ENCOUNTER — READMISSION MANAGEMENT (OUTPATIENT)
Dept: CALL CENTER | Facility: HOSPITAL | Age: 65
End: 2020-12-04

## 2020-12-04 NOTE — OUTREACH NOTE
COVID-19 Week 1 Survey      Responses   [facility] patient discharged from?  [facility]   Does the patient have one of the following disease processes/diagnoses(primary or secondary)?  COVID-19   COVID-19 underlying condition?  COPD   Call Number  Call 2   Week 1 Call successful?  No   Discharge diagnosis  Pneumonia due to COVID-19 virus          Michael Kimball RN

## 2020-12-05 ENCOUNTER — READMISSION MANAGEMENT (OUTPATIENT)
Dept: CALL CENTER | Facility: HOSPITAL | Age: 65
End: 2020-12-05

## 2020-12-05 NOTE — OUTREACH NOTE
COVID-19 Week 1 Survey      Responses   [facility] patient discharged from?  [facility]   Does the patient have one of the following disease processes/diagnoses(primary or secondary)?  COVID-19   COVID-19 underlying condition?  COPD   Call Number  Call 3   Week 1 Call successful?  Yes   Call start time  1155   Call end time  1201   General alerts for this patient  call wife's number, pt does not answer his   Discharge diagnosis  Pneumonia due to COVID-19 virus   Is patient permission given to speak with other caregiver?  Yes   List who call center can speak with  wife    Person spoke with today (if not patient) and relationship  wife   Meds reviewed with patient/caregiver?  Yes   Is the patient having any side effects they believe may be caused by any medication additions or changes?  No   Does the patient have all medications ordered at discharge?  Yes   Is the patient taking all medications as directed (includes completed medication regime)?  Yes   Medication comments  finished antibx   Home health comments  wife states he does not need HH. She also has two friends in medical field and they will check on pt for her   What DME was ordered?  O2 per [facility]   Has all DME been delivered?  Yes   DME comments  pt on 2L   Psychosocial issues?  No   Did the patient receive a copy of their discharge instructions?  Yes   Did the patient receive a copy of COVID-19 specific instructions?  Yes   Nursing interventions  Reviewed instructions with patient   What is the patient's perception of their health status since discharge?  Improving   Does the patient have any of the following symptoms?  Shortness of breath   Nursing Interventions  Nurse provided patient education   Pulse Ox monitoring  None   Is the patient/caregiver able to teach back steps to recovery at home?  Rest and rebuild strength, gradually increase activity, Eat a well-balance diet   Is the patient/caregiver able to teach back the hierarchy of who to call/visit  for symptoms/problems? PCP, Specialist, Home health nurse, Urgent Care, ED, 911  Yes   COVID-19 call completed?  Yes   Wrap up additional comments  Per wife, pt doing ronak each day. She will buy a pulse ox and monitor pt's oxygen level.           Starr Burgess RN

## 2020-12-10 ENCOUNTER — READMISSION MANAGEMENT (OUTPATIENT)
Dept: CALL CENTER | Facility: HOSPITAL | Age: 65
End: 2020-12-10

## 2020-12-10 NOTE — OUTREACH NOTE
COVID-19 Week 2 Survey      Responses   [facility] patient discharged from?  [facility]   Does the patient have one of the following disease processes/diagnoses(primary or secondary)?  COVID-19   COVID-19 underlying condition?  COPD   Call Number  Call 1   COVID-19 Week 2: Call 1 attempt successful?  Yes   Call start time  1531   Call end time  1536   Discharge diagnosis  Pneumonia due to COVID-19 virus   Meds reviewed with patient/caregiver?  Yes   Is the patient having any side effects they believe may be caused by any medication additions or changes?  No   Does the patient have all medications ordered at discharge?  Yes   Is the patient taking all medications as directed (includes completed medication regime)?  Yes   Does the patient have a primary care provider?   Yes   Comments regarding PCP  PCP APPOINTMENT IS 12/16   Does the patient have an appointment with their PCP or specialist within 7 days of discharge?  Greater than 7 days   What is preventing the patient from scheduling follow up appointments within 7 days of discharge?  Earlier appointment not available   Nursing Interventions  Verified appointment date/time/provider   Has the patient kept scheduled appointments due by today?  N/A   Has home health visited the patient within 72 hours of discharge?  N/A   What DME was ordered?  O2 per [facility]   Has all DME been delivered?  Yes   Psychosocial issues?  No   Did the patient receive a copy of their discharge instructions?  Yes   Did the patient receive a copy of COVID-19 specific instructions?  Yes   Nursing interventions  Reviewed instructions with patient   What is the patient's perception of their health status since discharge?  Improving   Does the patient have any of the following symptoms?  Shortness of breath   Nursing Interventions  Nurse provided patient education   Pulse Ox monitoring  None   Is the patient/caregiver able to teach back steps to recovery at home?  Set small, achievable goals for  return to baseline health, Rest and rebuild strength, gradually increase activity, Make a list of questions for provider's appointment, Practice good oral hygiene, Eat a well-balance diet   If the patient is a current smoker, are they able to teach back resources for cessation?  Not a smoker   Is the patient/caregiver able to teach back the hierarchy of who to call/visit for symptoms/problems? PCP, Specialist, Home health nurse, Urgent Care, ED, 911  Yes   Is the patient able to teach back COPD zones?  Yes   Nursing interventions  Education provided on various zones   Patient reports what zone on this call?  Green Zone   Green Zone  Reports doing well, Breathing without shortness of breath, Usual activity and exercise level, Usual amount of phlegm/mucus without difficulty coughing up, Appetite is good, Sleeping well   Green Zone interventions:  Take daily medications, Use oxygen as prescribed, Avoid indoor/outdoor triggers, Continue regular exercise/diet plan   COVID-19 call completed?  Yes          Martina Goel LPN

## 2020-12-16 ENCOUNTER — READMISSION MANAGEMENT (OUTPATIENT)
Dept: CALL CENTER | Facility: HOSPITAL | Age: 65
End: 2020-12-16

## 2020-12-16 NOTE — OUTREACH NOTE
COVID-19 Week 3 Survey      Responses   [facility] [place] patient discharged from?  [facility]   Does the patient have one of the following disease processes/diagnoses(primary or secondary)?  COVID-19   COVID-19 underlying condition?  None   Call Number  Call 1   COVID-19 Week 3: Call 1 attempt successful?  Yes   Call start time  1535   Call end time  1543   General alerts for this patient  call wife's number, pt does not answer his   Discharge diagnosis  Pneumonia due to COVID-19 virus   Person spoke with today (if not patient) and relationship  wife   Has the patient kept scheduled appointments due by today?  Yes   Psychosocial comments  wife had the virus   Comments  PCP is arranging outpt PT for resp. therapy soon. Encouraged to do IS & deep breathing/coughing several times a day while awake.    What is the patient's perception of their health status since discharge?  Improving   Does the patient have any of the following symptoms?  Shortness of breath, Cough   Nursing Interventions  Nurse provided patient education   Pulse Ox monitoring  Intermittent   Pulse Ox device source  Patient   O2 Sat comments  on 2L O2 walking from car to [facility] office was 94%, they do not have pulse ox but they used [facility] office one. They have ordered one off internet.    O2 Sat: education provided  Sat levels   Is the patient/caregiver able to teach back the hierarchy of who to call/visit for symptoms/problems? PCP, Specialist, Home health nurse, Urgent Care, ED, 911  Yes   COVID-19 call completed?  Yes          Марина Griffith RN

## 2020-12-23 ENCOUNTER — READMISSION MANAGEMENT (OUTPATIENT)
Dept: CALL CENTER | Facility: HOSPITAL | Age: 65
End: 2020-12-23

## 2020-12-23 NOTE — OUTREACH NOTE
COVID-19 Week 4 Survey      Responses   [facility] patient discharged from?  [facility]   Does the patient have one of the following disease processes/diagnoses(primary or secondary)?  COVID-19   COVID-19 underlying condition?  None   Call Number  Call 1   COVID-19 Week 4: Call 1 attempt successful?  Yes   Call start time  1247   Call end time  1301   Discharge diagnosis  Pneumonia due to COVID-19 virus   Is the patient taking all medications as directed (includes completed medication regime)?  Yes   Has the patient kept scheduled appointments due by today?  Yes   What is the patient's perception of their health status since discharge?  Improving   Does the patient have any of the following symptoms?  Cough, Shortness of breath [SOB with activity if off O2]   Nursing Interventions  Nurse provided patient education   Pulse Ox monitoring  Intermittent   O2 Sat comments  95% on RA   O2 Sat: education provided  Sat levels   Is the patient/caregiver able to teach back steps to recovery at home?  Rest and rebuild strength, gradually increase activity, Eat a well-balance diet   Is the patient/caregiver able to teach back the hierarchy of who to call/visit for symptoms/problems? PCP, Specialist, Home health nurse, Urgent Care, ED, 911  Yes   Is the patient interested in additional calls from an ambulatory ?  NOTE:  applies to high risk patients requiring additional follow-up.  No   Did the patient feel the follow up calls were helpful during their recovery period?  Yes   Was the number of calls appropriate?  Yes   Interested in COVID-19 Plasma Donation?  Uncertain [Told pt. if interested to contact nearest blood bank]   Wrap up additional comments  Pt. has a pulse oximeter now          Lizzy Keller RN

## 2021-05-13 ENCOUNTER — APPOINTMENT (OUTPATIENT)
Dept: MRI IMAGING | Facility: HOSPITAL | Age: 66
End: 2021-05-13

## 2021-05-13 ENCOUNTER — APPOINTMENT (OUTPATIENT)
Dept: GENERAL RADIOLOGY | Facility: HOSPITAL | Age: 66
End: 2021-05-13

## 2021-05-13 ENCOUNTER — HOSPITAL ENCOUNTER (EMERGENCY)
Facility: HOSPITAL | Age: 66
Discharge: HOME | End: 2021-05-13
Attending: EMERGENCY MEDICINE | Admitting: EMERGENCY MEDICINE

## 2021-05-13 VITALS
HEIGHT: 69 IN | TEMPERATURE: 98 F | HEART RATE: 68 BPM | OXYGEN SATURATION: 95 % | RESPIRATION RATE: 18 BRPM | BODY MASS INDEX: 27.4 KG/M2 | SYSTOLIC BLOOD PRESSURE: 121 MMHG | DIASTOLIC BLOOD PRESSURE: 62 MMHG | WEIGHT: 185 LBS

## 2021-05-13 DIAGNOSIS — G45.9 TIA (TRANSIENT ISCHEMIC ATTACK): Primary | ICD-10-CM

## 2021-05-13 LAB
ANION GAP SERPL CALCULATED.3IONS-SCNC: 16 MMOL/L (ref 5–15)
BASOPHILS # BLD AUTO: 0.1 10*3/MM3 (ref 0–0.2)
BASOPHILS NFR BLD AUTO: 0.7 % (ref 0–1.5)
BUN SERPL-MCNC: 15 MG/DL (ref 8–23)
BUN/CREAT SERPL: 15.6 (ref 7–25)
CALCIUM SPEC-SCNC: 9.5 MG/DL (ref 8.6–10.5)
CHLORIDE SERPL-SCNC: 103 MMOL/L (ref 98–107)
CO2 SERPL-SCNC: 19 MMOL/L (ref 22–29)
CREAT SERPL-MCNC: 0.96 MG/DL (ref 0.76–1.27)
DEPRECATED RDW RBC AUTO: 45.9 FL (ref 37–54)
EOSINOPHIL # BLD AUTO: 0.1 10*3/MM3 (ref 0–0.4)
EOSINOPHIL NFR BLD AUTO: 0.7 % (ref 0.3–6.2)
ERYTHROCYTE [DISTWIDTH] IN BLOOD BY AUTOMATED COUNT: 15.2 % (ref 12.3–15.4)
GFR SERPL CREATININE-BSD FRML MDRD: 78 ML/MIN/1.73
GLUCOSE SERPL-MCNC: 131 MG/DL (ref 65–99)
HCT VFR BLD AUTO: 46 % (ref 37.5–51)
HGB BLD-MCNC: 15.4 G/DL (ref 13–17.7)
HOLD SPECIMEN: NORMAL
LYMPHOCYTES # BLD AUTO: 0.6 10*3/MM3 (ref 0.7–3.1)
LYMPHOCYTES NFR BLD AUTO: 9 % (ref 19.6–45.3)
MCH RBC QN AUTO: 29 PG (ref 26.6–33)
MCHC RBC AUTO-ENTMCNC: 33.4 G/DL (ref 31.5–35.7)
MCV RBC AUTO: 86.7 FL (ref 79–97)
MONOCYTES # BLD AUTO: 0.3 10*3/MM3 (ref 0.1–0.9)
MONOCYTES NFR BLD AUTO: 4.7 % (ref 5–12)
NEUTROPHILS NFR BLD AUTO: 6 10*3/MM3 (ref 1.7–7)
NEUTROPHILS NFR BLD AUTO: 84.9 % (ref 42.7–76)
NRBC BLD AUTO-RTO: 0.1 /100 WBC (ref 0–0.2)
PLATELET # BLD AUTO: 230 10*3/MM3 (ref 140–450)
PMV BLD AUTO: 7.7 FL (ref 6–12)
POTASSIUM SERPL-SCNC: 3.7 MMOL/L (ref 3.5–5.2)
RBC # BLD AUTO: 5.3 10*6/MM3 (ref 4.14–5.8)
SODIUM SERPL-SCNC: 138 MMOL/L (ref 136–145)
WBC # BLD AUTO: 7.1 10*3/MM3 (ref 3.4–10.8)
WHOLE BLOOD HOLD SPECIMEN: NORMAL

## 2021-05-13 PROCEDURE — 80048 BASIC METABOLIC PNL TOTAL CA: CPT | Performed by: EMERGENCY MEDICINE

## 2021-05-13 PROCEDURE — 93005 ELECTROCARDIOGRAM TRACING: CPT | Performed by: EMERGENCY MEDICINE

## 2021-05-13 PROCEDURE — 99284 EMERGENCY DEPT VISIT MOD MDM: CPT

## 2021-05-13 PROCEDURE — 70551 MRI BRAIN STEM W/O DYE: CPT

## 2021-05-13 PROCEDURE — 71045 X-RAY EXAM CHEST 1 VIEW: CPT

## 2021-05-13 PROCEDURE — 85025 COMPLETE CBC W/AUTO DIFF WBC: CPT | Performed by: EMERGENCY MEDICINE

## 2021-05-13 RX ORDER — SODIUM CHLORIDE 0.9 % (FLUSH) 0.9 %
10 SYRINGE (ML) INJECTION AS NEEDED
Status: DISCONTINUED | OUTPATIENT
Start: 2021-05-13 | End: 2021-05-13 | Stop reason: HOSPADM

## 2021-05-14 LAB — QT INTERVAL: 376 MS
